# Patient Record
Sex: FEMALE | Race: WHITE | Employment: FULL TIME | URBAN - METROPOLITAN AREA
[De-identification: names, ages, dates, MRNs, and addresses within clinical notes are randomized per-mention and may not be internally consistent; named-entity substitution may affect disease eponyms.]

---

## 2018-09-20 ENCOUNTER — OFFICE VISIT (OUTPATIENT)
Dept: FAMILY MEDICINE CLINIC | Age: 38
End: 2018-09-20

## 2018-09-20 VITALS
WEIGHT: 154 LBS | HEART RATE: 77 BPM | BODY MASS INDEX: 29.07 KG/M2 | HEIGHT: 61 IN | RESPIRATION RATE: 16 BRPM | SYSTOLIC BLOOD PRESSURE: 127 MMHG | DIASTOLIC BLOOD PRESSURE: 84 MMHG | OXYGEN SATURATION: 98 % | TEMPERATURE: 98.4 F

## 2018-09-20 DIAGNOSIS — Z23 ENCOUNTER FOR IMMUNIZATION: ICD-10-CM

## 2018-09-20 DIAGNOSIS — Z11.59 SPECIAL SCREENING EXAMINATION FOR VIRAL DISEASE: ICD-10-CM

## 2018-09-20 DIAGNOSIS — Z00.00 ANNUAL PHYSICAL EXAM: Primary | ICD-10-CM

## 2018-09-20 RX ORDER — NORETHINDRONE AND ETHINYL ESTRADIOL 0.8-25(24)
KIT ORAL
Refills: 4 | COMMUNITY
Start: 2018-07-16

## 2018-09-20 NOTE — MR AVS SNAPSHOT
[address] [place] [address] [facility] 
[phone] Patient: Jay Hatch MRN: [id number] GJY:[birth year] Visit Information Date & Time Provider Department Dept. Phone Encounter #  
 9/20/2018  9:00 AM Raffy Ashby, [address] [facility] [phone] [id number] Follow-up Instructions Return in about 1 year (around 9/20/2019). Upcoming Health Maintenance Date Due DTaP/Tdap/Td series (1 - Tdap) 3/2/2003 PAP AKA CERVICAL CYTOLOGY 3/2/2003 Influenza Age 5 to Adult 8/1/2018 Allergies as of 9/20/2018  Review Complete On: 9/20/2018 By: Raffy Ashby DO Severity Noted Reaction Type Reactions Amoxicillin  09/20/2018    Nausea and Vomiting Antihistamine [Triprolidine-pseudoephedrine]  09/20/2018    Seizures Current Immunizations  Never Reviewed Name Date HPV 8/8/2008, 5/17/2008, 3/10/2008 Not reviewed this visit You Were Diagnosed With   
  
 Codes Comments Annual physical exam    -  Primary ICD-10-CM: Z00.00 ICD-9-CM: V70.0 Special screening examination for viral disease     ICD-10-CM: Z11.59 
ICD-9-CM: V73.99 Encounter for immunization     ICD-10-CM: V30 ICD-9-CM: V03.89 Vitals BP Pulse Temp Resp Height(growth percentile) Weight(growth percentile) 127/84 (BP 1 Location: Left arm, BP Patient Position: Sitting) 77 98.4 °F (36.9 °C) (Oral) 16 5' 1\" (1.549 m) 154 lb (69.9 kg) LMP SpO2 BMI OB Status Smoking Status (LMP Unknown) 98% 29.1 kg/m2 Chemically Induced Never Smoker Vitals History BMI and BSA Data Body Mass Index Body Surface Area  
 29.1 kg/m 2 1.73 m 2 Preferred Pharmacy Pharmacy Name Phone [id number] [place] - [address] [address] AT [address] [phone] Your Updated Medication List  
  
   
This list is accurate as of 9/20/18  9:54 AM.  Always use your most recent med list.  
  
  
 diph,Pertuss(Acell),Tet Vac-PF 2 Lf-(2.5-5-3-5 mcg)-5Lf/0.5 mL susp Commonly known as:  ADACEL  
0.5 mL by IntraMUSCular route once for 1 dose. KAITLIB FE 0.8mg-25mcg(24) and 75 mg (4) Chew Generic drug:  noreth-ethinyl estradiol-iron CHEW 1 TABLET BY MOUTH EVERYDAY WITH OUT PILL FREE INTERVAL Prescriptions Printed Refills diph,Pertuss,Acell,,Tet Vac-PF (ADACEL) 2 Lf-(2.5-5-3-5 mcg)-5Lf/0.5 mL susp 0 Si.5 mL by IntraMUSCular route once for 1 dose. Class: Print Route: IntraMUSCular We Performed the Following CBC W/O DIFF [96495 CPT(R)] DRUG SCREEN-10 W/CONFIRM, [place] [37538 CPT(R)] HBV PREVACCINATION (PROFILE X) [id number] CPT(R)] MEASLES/MUMPS/RUBELLA IMMUNITY [EEG50321 Custom] METABOLIC PANEL, BASIC [99114 CPT(R)] VARICELLA ZOSTER ABS, IGG/IGM [id number] CPT(R)] Follow-up Instructions Return in about 1 year (around 2019). Patient Instructions Well Visit, Ages 25 to 48: Care Instructions Your Care Instructions Physical exams can help you stay healthy. Your doctor has checked your overall health and may have suggested ways to take good care of yourself. He or she also may have recommended tests. At home, you can help prevent illness with healthy eating, regular exercise, and other steps. Follow-up care is a key part of your treatment and safety. Be sure to make and go to all appointments, and call your doctor if you are having problems. It's also a good idea to know your test results and keep a list of the medicines you take. How can you care for yourself at home? · Reach and stay at a healthy weight. This will lower your risk for many problems, such as obesity, diabetes, heart disease, and high blood pressure. · Get at least 30 minutes of physical activity on most days of the week. Walking is a good choice.  You also may want to do other activities, such as running, swimming, cycling, or playing tennis or team sports. Discuss any changes in your exercise program with your doctor. · Do not smoke or allow others to smoke around you. If you need help quitting, talk to your doctor about stop-smoking programs and medicines. These can increase your chances of quitting for good. · Talk to your doctor about whether you have any risk factors for sexually transmitted infections (STIs). Having one sex partner (who does not have STIs and does not have sex with anyone else) is a good way to avoid these infections. · Use birth control if you do not want to have children at this time. Talk with your doctor about the choices available and what might be best for you. · Protect your skin from too much sun. When you're outdoors from 10 a.m. to 4 p.m., stay in the shade or cover up with clothing and a hat with a wide brim. Wear sunglasses that block UV rays. Even when it's cloudy, put broad-spectrum sunscreen (SPF 30 or higher) on any exposed skin. · See a dentist one or two times a year for checkups and to have your teeth cleaned. · Wear a seat belt in the car. · Drink alcohol in moderation, if at all. That means no more than 2 drinks a day for men and 1 drink a day for women. Follow your doctor's advice about when to have certain tests. These tests can spot problems early. For everyone · Cholesterol. Have the fat (cholesterol) in your blood tested after age 21. Your doctor will tell you how often to have this done based on your age, family history, or other things that can increase your risk for heart disease. · Blood pressure. Have your blood pressure checked during a routine doctor visit. Your doctor will tell you how often to check your blood pressure based on your age, your blood pressure results, and other factors. · Vision. Talk with your doctor about how often to have a glaucoma test. 
· Diabetes. Ask your doctor whether you should have tests for diabetes. · Colon cancer. Have a test for colon cancer at age 48. You may have one of several tests. If you are younger than 48, you may need a test earlier if you have any risk factors. Risk factors include whether you already had a precancerous polyp removed from your colon or whether your parent, brother, sister, or child has had colon cancer. For women · Breast exam and mammogram. Talk to your doctor about when you should have a clinical breast exam and a mammogram. Medical experts differ on whether and how often women under 50 should have these tests. Your doctor can help you decide what is right for you. · Pap test and pelvic exam. Begin Pap tests at age 24. A Pap test is the best way to find cervical cancer. The test often is part of a pelvic exam. Ask how often to have this test. 
· Tests for sexually transmitted infections (STIs). Ask whether you should have tests for STIs. You may be at risk if you have sex with more than one person, especially if your partners do not wear condoms. For men · Tests for sexually transmitted infections (STIs). Ask whether you should have tests for STIs. You may be at risk if you have sex with more than one person, especially if you do not wear a condom. · Testicular cancer exam. Ask your doctor whether you should check your testicles regularly. · Prostate exam. Talk to your doctor about whether you should have a blood test (called a PSA test) for prostate cancer. Experts differ on whether and when men should have this test. Some experts suggest it if you are older than 39 and are -American or have a father or brother who got prostate cancer when he was younger than 72. When should you call for help? Watch closely for changes in your health, and be sure to contact your doctor if you have any problems or symptoms that concern you. Where can you learn more? Go to http://ronak-derek.info/. Enter P072 in the search box to learn more about \"Well Visit, Ages 25 to 48: Care Instructions. \" Current as of: May 16, 2017 Content Version: 11.7 © 3105-5901 [employer]. Care instructions adapted under license by [employer] (which disclaims liability or warranty for this information). If you have questions about a medical condition or this instruction, always ask your healthcare professional. [place] [address] any warranty or liability for your use of this information. Introducing [facility] & HEALTH SERVICES! Jose Weber introduces [employer] patient portal. Now you can access parts of your medical record, email your doctor's office, and request medication refills online. 1. In your internet browser, go to [email or web address] 2. Click on the First Time User? Click Here link in the Sign In box. You will see the New Member Sign Up page. 3. Enter your [employer] Access Code exactly as it appears below. You will not need to use this code after youve completed the sign-up process. If you do not sign up before the expiration date, you must request a new code. · [employer] Access Code: [email or web address] Expires: 12/19/2018  9:00 AM 
 
4. Enter the last four digits of your Social Security Number (xxxx) and Date of Birth (mm/dd/yyyy) as indicated and click Submit. You will be taken to the next sign-up page. 5. Create a [employer] ID. This will be your [employer] login ID and cannot be changed, so think of one that is secure and easy to remember. 6. Create a [employer] password. You can change your password at any time. 7. Enter your Password Reset Question and Answer. This can be used at a later time if you forget your password. 8. Enter your e-mail address. You will receive e-mail notification when new information is available in [address] 9. Click Sign Up. You can now view and download portions of your medical record. 10. Click the Download Summary menu link to download a portable copy of your medical information. If you have questions, please visit the Frequently Asked Questions section of the [employer]) website. Remember, [employer] is NOT to be used for urgent needs. For medical emergencies, dial 911. Now available from your iPhone and Android! Please provide this summary of care documentation to your next provider. If you have any questions after today's visit, please call [phone].

## 2018-09-20 NOTE — PROGRESS NOTES
Rosa Escalona is a 41 y.o. female who presents today for her annual checkup    Last annual exam was 2 years ago. No complaints today. 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 LMP: 1 year ago- on birth control. Lázaro Bright. Last pelvic/PAP: 3 months ago, normal. OB/GYN is [facility]. Tested for BRCA and negative. Has a family history of breast cancer. Vegetarian diet. Exercises by walking 3.5 miles/night. Yearly eye exam and UTD on dental exam.     Current Outpatient Prescriptions   Medication Sig Dispense Refill    KAITLIB FE 0.8mg-25mcg(24) and 75 mg (4) chew CHEW 1 TABLET BY MOUTH EVERYDAY WITH OUT PILL FREE INTERVAL  4     Allergies: Amoxicillin and Antihistamine [triprolidine-pseudoephedrine]   Social History     Social History    Marital status:      Spouse name: N/A    Number of children: N/A    Years of education: N/A     Occupational History    Not on file. Social History Main Topics    Smoking status: Never Smoker    Smokeless tobacco: Never Used    Alcohol use 1.8 oz/week     3 Glasses of wine per week      Comment: 3xweek    Drug use: No    Sexual activity: Yes     Partners: Male     Other Topics Concern    Not on file     Social History Narrative    No narrative on file     Family History   Problem Relation Age of Onset    Breast Cancer Mother     Diabetes Mother     Thyroid Disease Mother     Melanoma Father      Past Medical History:   Diagnosis Date    Depression     Hepatitis     unknown type    Incontinentia pigmenti     Seizure ([facility] [address]        Review of Systems - History obtained from the patient  Gen: negative for weight loss, fever, night sweats  HEENT: negative for blurry vision, sore throat.   CV: negative for chest pain, palpitations, edema  Resp: negative for cough, shortness of breath, wheezing  GI: negative for change in bowel habits, abdominal pain, black or bloody stools  : negative for frequency, dysuria, hematuria,  MSK: negative for back pain, joint pain, muscle pain  Skin :negative for itching, rash, hives  Neuro: negative for dizziness, headache, confusion, weakness  Psych: negative for anxiety, depression, SI/HI    Physical Exam    Visit Vitals    /84 (BP 1 Location: Left arm, BP Patient Position: Sitting)    Pulse 77    Temp 98.4 °F (36.9 °C) (Oral)    Resp 16    Ht 5' 1\" (1.549 m)    Wt 154 lb (69.9 kg)    LMP  (LMP Unknown)  Comment: continuous OCP's    SpO2 98%    BMI 29.1 kg/m2     Gen: Well developed, well nourished female in no acute distress  HEENT: normocephalic/atraumatic; PERRL; TM intact, translucent, and neutral BL;  oropharynx shows no erythema or exudates  Skin:  No rashes or suspicious skin lesions noted  Neck:   Supple, no lympadenopathy, no thyromegaly  Card:  RRR, no m/r/g  Chest:  CTAB, no w/r/r  Abd:  BS+, Soft, nontender/nondistended  Extr:  2+ pulses BL, no LE edema   MS:   full ROM, 5/5 strength BL, sensation intact  Neuro: AAO X 3,   Psych:  Nl mood and affect  Pelvic: declined. See OB/GYN      Assessment:    ICD-10-CM ICD-9-CM    1. Annual physical exam X21.36 D99.3 METABOLIC PANEL, BASIC      CBC W/O DIFF      DRUG SCREEN-10 W/CONFIRM, SE   2. Special screening examination for viral disease Z11.59 V73.99 MEASLES/MUMPS/RUBELLA IMMUNITY      VARICELLA ZOSTER ABS, IGG/IGM      HBV PREVACCINATION (PROFILE X)   3. Encounter for immunization Z23 V03.89 diph,Pertuss,Acell,,Tet Vac-PF (ADACEL) 2 Lf-(2.5-5-3-5 mcg)-5Lf/0.5 mL susp      CANCELED: TETANUS, DIPHTHERIA TOXOIDS AND ACELLULAR PERTUSSIS VACCINE (TDAP), IN INDIVIDS. >=7, IM       40 y/o female here for annual exam. 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 Overall, doing well. No complaints. See's OB/GYN for PAP. Will check CBC, BMP, MMR immunity, Varicella immunity, hep B immunity, and uds per school forms. . Our office was out of Tdap at the time of visit so Rx for Tdap given to patient.  She needs 2 step PPD for completion of school form. Okay for her to come back on tomorrow, Friday for first ppd. Will complete and fax forms once all data is collected. Encouraged healthy diet and exercise. Plan:    1. Well woman - physical / health maintenance visit   Counseled re: diet, exercise, healthy lifestyle   Return tomorrow for first PPD placement and return within 48-72 hours for reading. Will need second PPD placed after that. Return for yearly wellness visits    Follow-up Disposition:  Return in about 1 year (around 9/20/2019).       Patient was discussed with supervising attending, Dr. Bob Garza, DO

## 2018-09-20 NOTE — PROGRESS NOTES
I have reviewed the notes, assessments, and/or procedures performed by Dr. Landis Goldberg, I concur with her/his documentation of Jessika Becker.

## 2018-09-20 NOTE — PROGRESS NOTES
1. Have you been to the ER, urgent care clinic since your last visit? Hospitalized since your last visit? No    2. Have you seen or consulted any other health care providers outside of the [facility] since your last visit? Include any pap smears or colon screening. [facility]BG[facility] Dr[facility].  Last pap 3 months ago

## 2018-09-20 NOTE — PATIENT INSTRUCTIONS

## 2018-09-21 ENCOUNTER — CLINICAL SUPPORT (OUTPATIENT)
Dept: FAMILY MEDICINE CLINIC | Age: 38
End: 2018-09-21

## 2018-09-21 DIAGNOSIS — Z11.1 SCREENING FOR TUBERCULOSIS: Primary | ICD-10-CM

## 2018-09-21 NOTE — PROGRESS NOTES
PPD placed right forearm  By Guille Robertson LPN, Patient tolerated well. Patient advised to return Monday before 3pm. Patient advised to contact school regarding rules as to when to get 2nd PPD test performed.  Patient stated understanding

## 2018-09-24 ENCOUNTER — OFFICE VISIT (OUTPATIENT)
Dept: FAMILY MEDICINE CLINIC | Age: 38
End: 2018-09-24

## 2018-09-24 VITALS — DIASTOLIC BLOOD PRESSURE: 81 MMHG | HEART RATE: 96 BPM | TEMPERATURE: 98.4 F | SYSTOLIC BLOOD PRESSURE: 124 MMHG

## 2018-09-24 DIAGNOSIS — Z11.1 ENCOUNTER FOR PPD SKIN TEST READING: Primary | ICD-10-CM

## 2018-09-24 LAB
MM INDURATION POC: 0 MM (ref 0–5)
PPD POC: NEGATIVE NEGATIVE

## 2018-09-24 NOTE — PROGRESS NOTES
PPD Reading Note  [facility] Result: 0mm induration.   Interpretation: Negative  Allergic reaction: no

## 2018-09-24 NOTE — MR AVS SNAPSHOT
Mahi Barone 
 
 
 [address] [place] 
[phone] Patient: Olga Fuchs MRN: [id number] IEB:[birth year] Visit Information Date & Time Provider Department Dept. Phone Encounter #  
 9/24/2018  3:00 PM [facility] [address] [facility] [id number] Your Appointments 9/24/2018  3:00 PM  
Any with NURSE3_CFM [address] ([address] [place] [address] Road) Appt Note: ppd check [address] [facility] [address]  
[phone]  
  
   
 [address] [facility] [address]  
  
    
 10/8/2018  2:30 PM  
Nurse Visit with NURSE3_CFM [address] ([address] [place] [address] Road) Appt Note: ppd test  
 [address] [facility] [address]  
[phone]  
  
   
 [address] [facility] [address]  
  
    
 10/11/2018  2:30 PM  
Nurse Visit with NURSE3_CFM [address] ([address] [place] [address] Road) Appt Note: ppd check [address] [place]  
[phone] Upcoming Health Maintenance Date Due DTaP/Tdap/Td series (1 - Tdap) 3/2/2003 PAP AKA CERVICAL CYTOLOGY 3/2/2003 Influenza Age 5 to Adult 8/1/2018 Allergies as of 9/24/2018  Review Complete On: 9/20/2018 By: Justin Dyer DO Severity Noted Reaction Type Reactions Amoxicillin  09/20/2018    Nausea and Vomiting Antihistamine [Triprolidine-pseudoephedrine]  09/20/2018    Seizures Current Immunizations  Never Reviewed Name Date HPV 8/8/2008, 5/17/2008, 3/10/2008 TB Skin Test (PPD) Intradermal 9/21/2018 Not reviewed this visit You Were Diagnosed With   
  
 Codes Comments Encounter for PPD skin test reading    -  Primary ICD-10-CM: Z11.1 ICD-9-CM: V67.59 Vitals BP Pulse Temp LMP OB Status Smoking Status  124/81 (BP 1 Location: Left arm, BP Patient Position: Sitting) 96 98.4 °F (36.9 °C) (Oral) (LMP Unknown) Chemically Induced Never Smoker Preferred Pharmacy Pharmacy Name Phone [id number] [id number] [address] [address] AT [address] [phone] Your Updated Medication List  
  
   
This list is accurate as of 9/24/18  2:34 PM.  Always use your most recent med list. KAITLIB FE 0.8mg-25mcg(24) and 75 mg (4) Chew Generic drug:  noreth-ethinyl estradiol-iron CHEW 1 TABLET BY MOUTH EVERYDAY WITH OUT PILL FREE INTERVAL Introducing [facility] & HEALTH SERVICES! [address] introduces [employer] patient portal. Now you can access parts of your medical record, email your doctor's office, and request medication refills online. 1. In your internet browser, go to [email or web address] 2. Click on the First Time User? Click Here link in the Sign In box. You will see the New Member Sign Up page. 3. Enter your [employer] Access Code exactly as it appears below. You will not need to use this code after youve completed the sign-up process. If you do not sign up before the expiration date, you must request a new code. · [employer] Access Code: [email or web address] Expires: 12/19/2018  9:00 AM 
 
4. Enter the last four digits of your Social Security Number (xxxx) and Date of Birth (mm/dd/yyyy) as indicated and click Submit. You will be taken to the next sign-up page. 5. Create a [employer] ID. This will be your [employer] login ID and cannot be changed, so think of one that is secure and easy to remember. 6. Create a [employer] password. You can change your password at any time. 7. Enter your Password Reset Question and Answer. This can be used at a later time if you forget your password. 8. Enter your e-mail address. You will receive e-mail notification when new information is available in [address] 9. Click Sign Up. You can now view and download portions of your medical record. 10. Click the Download Summary menu link to download a portable copy of your medical information. If you have questions, please visit the Frequently Asked Questions section of the [employer] website. Remember, [employer] is NOT to be used for urgent needs. For medical emergencies, dial 911. Now available from your iPhone and Android! Please provide this summary of care documentation to your next provider. Your primary care clinician is listed as Smita Woodruff. If you have any questions after today's visit, please call [phone].

## 2018-09-25 LAB
AMPHETAMINES SERPL QL SCN: NEGATIVE NG/ML
BARBITURATES SERPL QL SCN: NEGATIVE UG/ML
BENZODIAZ SERPL QL SCN: NEGATIVE NG/ML
BUN SERPL-MCNC: 9 MG/DL (ref 6–20)
BUN/CREAT SERPL: 13 (ref 9–23)
CALCIUM SERPL-MCNC: 10.1 MG/DL (ref 8.7–10.2)
CANNABINOIDS SERPL QL SCN: NEGATIVE NG/ML
CHLORIDE SERPL-SCNC: 100 MMOL/L (ref 96–106)
CO2 SERPL-SCNC: 24 MMOL/L (ref 20–29)
COCAINE+BZE SERPL QL SCN: NEGATIVE NG/ML
CREAT SERPL-MCNC: 0.7 MG/DL (ref 0.57–1)
ERYTHROCYTE [DISTWIDTH] IN BLOOD BY AUTOMATED COUNT: 12.6 % (ref 12.3–15.4)
GLUCOSE SERPL-MCNC: 87 MG/DL (ref 65–99)
HBV CORE AB SERPL QL IA: NEGATIVE
HBV CORE IGM SERPL QL IA: NEGATIVE
HBV SURFACE AB SER QL: REACTIVE
HBV SURFACE AG SERPL QL IA: NEGATIVE
HCT VFR BLD AUTO: 40.7 % (ref 34–46.6)
HGB BLD-MCNC: 14.1 G/DL (ref 11.1–15.9)
MCH RBC QN AUTO: 32.6 PG (ref 26.6–33)
MCHC RBC AUTO-ENTMCNC: 34.6 G/DL (ref 31.5–35.7)
MCV RBC AUTO: 94 FL (ref 79–97)
METHADONE SERPL QL SCN: NEGATIVE NG/ML
MEV IGG SER IA-ACNC: 180 AU/ML
MUV IGG SER IA-ACNC: 108 AU/ML
OPIATES SERPL QL SCN: NEGATIVE NG/ML
OXYCODONES, 738315: NEGATIVE NG/ML
PCP SERPL QL SCN: NEGATIVE NG/ML
PLATELET # BLD AUTO: 353 X10E3/UL (ref 150–379)
POTASSIUM SERPL-SCNC: 4.2 MMOL/L (ref 3.5–5.2)
PROPOXYPH SERPL QL SCN: NEGATIVE NG/ML
RBC # BLD AUTO: 4.32 X10E6/UL (ref 3.77–5.28)
RUBV IGG SERPL IA-ACNC: 1.89 INDEX
SODIUM SERPL-SCNC: 139 MMOL/L (ref 134–144)
VZV IGG SER IA-ACNC: 1171 INDEX
VZV IGM SER IA-ACNC: <0.91 INDEX (ref 0–0.9)
WBC # BLD AUTO: 6.7 X10E3/UL (ref 3.4–10.8)

## 2018-09-27 ENCOUNTER — TELEPHONE (OUTPATIENT)
Dept: FAMILY MEDICINE CLINIC | Age: 38
End: 2018-09-27

## 2018-10-04 PROBLEM — K64.9 HEMORRHOIDS: Status: ACTIVE | Noted: 2018-10-04

## 2018-10-04 PROBLEM — F32.A DEPRESSION: Status: ACTIVE | Noted: 2018-10-04

## 2018-10-08 ENCOUNTER — CLINICAL SUPPORT (OUTPATIENT)
Dept: FAMILY MEDICINE CLINIC | Age: 38
End: 2018-10-08

## 2018-10-08 VITALS — DIASTOLIC BLOOD PRESSURE: 78 MMHG | TEMPERATURE: 98.9 F | SYSTOLIC BLOOD PRESSURE: 120 MMHG | HEART RATE: 80 BPM

## 2018-10-08 DIAGNOSIS — Z23 ENCOUNTER FOR IMMUNIZATION: Primary | ICD-10-CM

## 2018-10-10 ENCOUNTER — HOSPITAL ENCOUNTER (OUTPATIENT)
Dept: GENERAL RADIOLOGY | Age: 38
Discharge: HOME | End: 2018-10-10
Attending: FAMILY MEDICINE
Payer: COMMERCIAL

## 2018-10-10 ENCOUNTER — CLINICAL SUPPORT (OUTPATIENT)
Dept: FAMILY MEDICINE CLINIC | Age: 38
End: 2018-10-10

## 2018-10-10 DIAGNOSIS — Z00.00 HEALTH CARE MAINTENANCE: Primary | ICD-10-CM

## 2018-10-10 DIAGNOSIS — R76.11 POSITIVE PPD: Primary | ICD-10-CM

## 2018-10-10 DIAGNOSIS — R76.11 POSITIVE PPD: ICD-10-CM

## 2018-10-10 PROCEDURE — 71046 X-RAY EXAM CHEST 2 VIEWS: CPT

## 2018-10-10 RX ORDER — CLOSTRIDIUM TETANI TOXOID ANTIGEN (FORMALDEHYDE INACTIVATED), CORYNEBACTERIUM DIPHTHERIAE TOXOID ANTIGEN (FORMALDEHYDE INACTIVATED), BORDETELLA PERTUSSIS TOXOID ANTIGEN (GLUTARALDEHYDE INACTIVATED), BORDETELLA PERTUSSIS FILAMENTOUS HEMAGGLUTININ ANTIGEN (FORMALDEHYDE INACTIVATED), BORDETELLA PERTUSSIS PERTACTIN ANTIGEN, AND BORDETELLA PERTUSSIS FIMBRIAE 2/3 ANTIGEN 5; 2; 2.5; 5; 3; 5 [LF]/.5ML; [LF]/.5ML; UG/.5ML; UG/.5ML; UG/.5ML; UG/.5ML
INJECTION, SUSPENSION INTRAMUSCULAR
Refills: 0 | COMMUNITY
Start: 2018-09-20 | End: 2018-12-04 | Stop reason: ALTCHOICE

## 2018-10-10 NOTE — PROGRESS NOTES
lainey returned to the office for ppd reading. Patient tested 15mm, positive. Dr. Presley Smoke advised. First ppd reading was negative. Second ppd reading positive 15mm.

## 2018-10-11 ENCOUNTER — TELEPHONE (OUTPATIENT)
Dept: FAMILY MEDICINE CLINIC | Age: 38
End: 2018-10-11

## 2018-10-22 ENCOUNTER — OFFICE VISIT (OUTPATIENT)
Dept: FAMILY MEDICINE CLINIC | Age: 38
End: 2018-10-22

## 2018-10-22 VITALS
HEART RATE: 78 BPM | DIASTOLIC BLOOD PRESSURE: 87 MMHG | HEIGHT: 61 IN | OXYGEN SATURATION: 98 % | TEMPERATURE: 98.9 F | BODY MASS INDEX: 29.45 KG/M2 | SYSTOLIC BLOOD PRESSURE: 129 MMHG | WEIGHT: 156 LBS | RESPIRATION RATE: 16 BRPM

## 2018-10-22 DIAGNOSIS — R05.9 COUGH: ICD-10-CM

## 2018-10-22 DIAGNOSIS — R76.11 POSITIVE PPD: Primary | ICD-10-CM

## 2018-10-22 RX ORDER — ISONIAZID 300 MG/1
300 TABLET ORAL DAILY
Qty: 30 TAB | Refills: 9 | Status: SHIPPED | OUTPATIENT
Start: 2018-10-22 | End: 2018-11-21

## 2018-10-22 RX ORDER — LANOLIN ALCOHOL/MO/W.PET/CERES
50 CREAM (GRAM) TOPICAL DAILY
Qty: 30 TAB | Refills: 9 | Status: SHIPPED | OUTPATIENT
Start: 2018-10-22 | End: 2019-09-25

## 2018-10-22 NOTE — PROGRESS NOTES
Quinn Luo is a 41 y.o. female who had concerns including Results (chest x ray ); Immunization/Injection (Patient desires Hep B injection ); and Cough (dry cough, sick 3 weeks ago but now cough has continued but feels ok). Recently had a positive PPD. Repeat CXR was negative. No previous exposure to someone with TB. Did do a lot of traveling out of the country during previous job, often traveling to [place] and [facility]. 1st PPD was negative, however 2nd PPD was positive. No night sweats or fevers. reports cough. Dry cough. Been taking mucinex with minimal relief. Cough has been present for 3 weeks. No history of heart burn. No hemoptysis. Denies fever, chills, n/v, d/c. Cough worse when she lays down to go to bed. Dry cough w/o sputum production. ROS: (positive in bold)  General: wt loss, fever, chills, fatigue   HEENT: changes in vision,sore throat, runny nose  Cardiac: chest pain, palpitations, PEREZ, edema   Pul: SOB, dyspnea, wheezing, cough, hemoptysis  GI: abdominal pain, N&V, diarrhea, constipation   Neuro: weakness, parasthesias, headache, lightheaded, dizziness. Past Medical History:  Past Medical History:   Diagnosis Date    Depression     Hepatitis     unknown type    Incontinentia pigmenti     Seizure ([facility] [address]        Past Surgical History:  Past Surgical History:   Procedure Laterality Date    HX SKIN BIOPSY         Family History:  Family History   Problem Relation Age of Onset    Breast Cancer Mother     Diabetes Mother     Thyroid Disease Mother     Melanoma Father        Allergies: Allergies   Allergen Reactions    Amoxicillin Nausea and Vomiting    Antihistamine [Triprolidine-Pseudoephedrine] Seizures       Social History:  Social History     Tobacco Use    Smoking status: Never Smoker    Smokeless tobacco: Never Used   Substance Use Topics    Alcohol use: Yes     Alcohol/week: 1.8 oz     Types: 3 Glasses of wine per week     Comment: 3xweek    Drug use:  No Current Meds:  Current Outpatient Medications on File Prior to Visit   Medication Sig Dispense Refill    ADACEL,TDAP ADOLESN/ADULT,,PF, 2 Lf-(2.5-5-3-5 mcg)-5Lf/0.5 mL syrg vaccine TO BE ADMINISTERED BY PHARMACIST FOR IMMUNIZATION  0    FLUZONE QUAD 7313-9141, PF, syrg injection TO BE ADMINISTERED BY PHARMACIST FOR IMMUNIZATION  0    KAITLIB FE 0.8mg-25mcg(24) and 75 mg (4) chew CHEW 1 TABLET BY MOUTH EVERYDAY WITH OUT PILL FREE INTERVAL  4     No current facility-administered medications on file prior to visit. Visit Vitals  /87 (BP 1 Location: Left arm, BP Patient Position: Sitting)   Pulse 78   Temp 98.9 °F (37.2 °C) (Oral)   Resp 16   Ht 5' 1\" (1.549 m)   Wt 156 lb (70.8 kg)   SpO2 98%   BMI 29.48 kg/m²       Gen:  Well developed, well nourished female in no acute distress  HEENT: normocephalic/atraumatic;EOMI  Card:  RRR, no m/r/g  Chest:  CTAB, no w/r/r  Abd:  BS+, Soft, nontender/nondistended  Psych:  Nl mood and affect     Assessment:      ICD-10-CM ICD-9-CM    1. Positive PPD R76.11 795.51 HEPATIC FUNCTION PANEL      isoniazid (NYDRAZID) 300 mg tablet      pyridoxine, vitamin B6, (VITAMIN B-6) 50 mg tablet   2. Cough R05 786.2       1. Positive PPD  2nd PPD in 2 part PPD series was positive. Likely latent TB. She underwent CXR which was unremarkable. No known exposure to TB. Very low risk for TB. Discussed treatment options including repeat interferon or staring INH for 6-9months. Discussed with pulmonology and they recommend starting INH. Discussed with patient and she agrees. Will start INH and vit B6. Will check LFT's and will need to repeat in 1 month. She does have a remote history of hepatitis, but per patient her liver function has since improved. 2. Cough  Viral URI vs silent GERD. No signs of bacterial infection. Likely silent GERD. Will try OTC PPI to see if symptoms resolve. I think cough is less likely related to above positive PPD. Will continue to monitor. Plan:    1. Positive PPD  - Start INH and Vit B6. Will continue therapy for 6-9 months. - Check LFT's now and in 1 month. - Monitor for side effects to medication  - Follow-up in 1 month. 2. Cough  - Try OTC PPI to see if symptoms resolve  - Continue mucinex and flonase PRN  - Monitor for fever, chills, sputum production or coughing up blood and return to clinic if you develop any of the above symptoms    I have discussed the diagnosis with the patient and the intended plan as seen in the above orders. The patient has received an after-visit summary and questions were answered concerning future plans. I have discussed medication side effects and warnings with the patient as well. The patient agrees and understands above plan. Follow-up Disposition:  Return in about 1 month (around 11/22/2018) for Medication follow-up; INH. Patient discussed with supervising attending.     Itz Marmolejo,

## 2018-10-22 NOTE — PROGRESS NOTES
I have reviewed the notes, assessments, and/or procedures performed, I concur with the residents documentation of Hammad Ledbetter.

## 2018-10-22 NOTE — PROGRESS NOTES
Identified pt with two pt identifiers(name and ). Chief Complaint   Patient presents with    Results     chest x ray     Immunization/Injection     Patient desires Hep B injection         Health Maintenance Due   Topic    PAP AKA CERVICAL CYTOLOGY        Wt Readings from Last 3 Encounters:   10/22/18 156 lb (70.8 kg)   18 154 lb (69.9 kg)     Temp Readings from Last 3 Encounters:   10/08/18 98.9 °F (37.2 °C) (Oral)   18 98.4 °F (36.9 °C) (Oral)   18 98.4 °F (36.9 °C) (Oral)     BP Readings from Last 3 Encounters:   10/08/18 120/78   18 124/81   18 127/84     Pulse Readings from Last 3 Encounters:   10/08/18 80   18 96   18 77         Learning Assessment:  :     No flowsheet data found. Depression Screening:  :     PHQ over the last two weeks 2018   Little interest or pleasure in doing things Not at all   Feeling down, depressed, irritable, or hopeless Not at all   Total Score PHQ 2 0       Fall Risk Assessment:  :     No flowsheet data found. Abuse Screening:  :     No flowsheet data found. Coordination of Care Questionnaire:  :     1) Have you been to an emergency room, urgent care clinic since your last visit? no   Hospitalized since your last visit? no             2) Have you seen or consulted any other health care providers outside of [address] [place] [address] since your last visit? no  (Include any pap smears or colon screenings in this section.)    3) Do you have an Advance Directive on file? no  Are you interested in receiving information about Advance Directives? no    Patient is accompanied by self I have received verbal consent from Hammad Ledbetter to discuss any/all medical information while they are present in the room. Reviewed record in preparation for visit and have obtained necessary documentation. Medication reconciliation up to date and corrected with patient at this time.

## 2018-10-22 NOTE — PATIENT INSTRUCTIONS
Tuberculosis (Latent TB): Care Instructions  Your Care Instructions    Latent tuberculosis (TB) means that you have bacteria in your body that could cause active TB disease. You can't spread the bacteria to other people at this time. But if your immune system can't keep the bacteria from growing, the disease becomes active. People with weakened immune systems are more likely to develop active TB. With active TB in your lungs, you can spread the disease to others. Active TB is a serious disease. Latent TB doesn't have any symptoms. You may even be surprised that you have it, since you don't feel sick. It's very important to take your antibiotic medicine as your doctor tells you to. This treatment prevents you from getting active TB. It takes a long time to rid your body of TB. You may be taking medicine for 4 to 9 months. During your treatment you'll see your doctor for tests to see how the medicine is working. Your doctor will help guide you through this long process. You may have directly observed therapy (DOT). This means that a health care worker watches when you take your medicine. DOT helps you remember to take your medicine. And it helps you complete your treatment as soon as possible. Follow-up care is a key part of your treatment and safety. Be sure to make and go to all appointments, and call your doctor if you are having problems. It's also a good idea to know your test results and keep a list of the medicines you take. How can you care for yourself at home? · Take your antibiotics as directed. You need to take the full course of antibiotics. · If you get an upset stomach while taking the medicine, ask your doctor if it's okay to take it with food. · If you forget to take your medicine, take the dose as soon as you can if it's the same day. Do not take two doses at the same time. If the day has passed, then take your next scheduled dose.  Tell your doctor or public health worker that you missed a dose so he or she can adjust your treatment schedule. · Avoid drinking alcohol. Alcohol may interact with your medicine and cause side effects. · If you don't have DOT, there are things you can do to help remind you to take your medicine:  ? Take your medicine at the same time every day. ? Set a reminder alarm. ? Use a pillbox. ? Put a reminder note on your mirror or refrigerator. ? Karlene Sang a calendar after you take your medicine. When should you call for help? Call 911 anytime you think you may need emergency care. For example, call if:    · You have severe trouble breathing.    Call your doctor now or seek immediate medical care if:    · You are short of breath.     · You have a new or worse cough.     · You are dizzy or lightheaded, or you feel like you may faint.     · You have new or worse diarrhea.    Watch closely for changes in your health, and be sure to contact your doctor if:    · You lose weight.     · You have night sweats.     · You do not get better as expected. Where can you learn more? Go to http://ronak-derek.info/. Enter U371 in the search box to learn more about \"Tuberculosis (Latent TB): Care Instructions. \"  Current as of: November 18, 2017  Content Version: 11.8  © 3386-9301 [employer]. Care instructions adapted under license by [employer] (which disclaims liability or warranty for this information). If you have questions about a medical condition or this instruction, always ask your healthcare professional. [place] [address] any warranty or liability for your use of this information. Isoniazid (By mouth)   Isoniazid (eye-chris-RODRICK-a-zid)  Treats tuberculosis (TB). Brand Name(s):   There may be other brand names for this medicine. When This Medicine Should Not Be Used:    You should not use this medicine if you have ever had an allergic reaction to isoniazid or reactions such as fever, chills, or arthritis from taking this medicine. You should not use isoniazid if you have liver damage from taking it before, or if you have severe liver disease of any type. How to Use This Medicine:   Tablet, Liquid  · Your doctor will tell you how much medicine to take and how often. It is very important to take this medicine on a regular schedule. · You may need to take isoniazid for several weeks or months. Keep taking it even if you have no symptoms. Your doctor will tell you when you can stop taking it. · Take on an empty stomach (1 hour before or 2 hours after a meal). May be taken with food to avoid stomach upset. · Shake the oral liquid well just before each use. Measure the dose using a marked measuring spoon or medicine cup. If a dose is missed:   · Take the missed dose as soon as possible. · Skip the missed dose if it is almost time for your next regular dose. · You should not use two doses at the same time. How to Store and Dispose of This Medicine:   · Store at room temperature, away from heat, moisture, and direct light. Do not freeze. · Keep all medicine out of the reach of children. Drugs and Foods to Avoid:   Ask your doctor or pharmacist before using any other medicine, including over-the-counter medicines, vitamins, and herbal products. · Do not drink alcohol while taking isoniazid. · Make sure your doctor knows if you are taking blood thinners (such as Coumadin®), birth control pills, Dilantin®, or Antabuse®. · If you take antacids (such as Mylanta®, Maalox®, or Amphojel®), wait at least 1 hour after taking them before taking your isoniazid dose. · Foods such as cheese ([address], [place], or [place]) or fish (tuna or skipjack) may cause headache, flushing, pounding heartbeat, sweating, dizziness, chills, or diarrhea. If you have these symptoms, call your doctor. Warnings While Using This Medicine:   · If you are pregnant or breastfeeding, talk to your doctor before taking this medicine.   · Check with your doctor before taking if you have kidney or liver disease, or if you are being treated for seizures. · Isoniazid can cause liver problems. It is very important that you see your doctor on a regular schedule and have blood tests done as ordered. · Isoniazid can affect the results of some urine sugar tests. If you have diabetes do not change your medicine or diet unless you have checked with your doctor. · If you notice tingling, numbness, or pain in your hands or feet, contact your doctor. Possible Side Effects While Using This Medicine:   Call your doctor right away if you notice any of these side effects:  · Yellow skin or eyes  · Dark or sheila-colored urine  · Severe stomach pain, nausea and vomiting  · Weakness  · Blurred vision, eye pain  · Fever  If you notice these less serious side effects, talk with your doctor:   · Stomach pain  · Diarrhea  If you notice other side effects that you think are caused by this medicine, tell your doctor. Call your doctor for medical advice about side effects. You may report side effects to FDA at 5-075-FDA-5068  © 2017 [facility] [place] Information is for End User's use only and may not be sold, redistributed or otherwise used for commercial purposes. The above information is an  only. It is not intended as medical advice for individual conditions or treatments. Talk to your doctor, nurse or pharmacist before following any medical regimen to see if it is safe and effective for you.

## 2018-10-23 LAB
ALBUMIN SERPL-MCNC: 4.8 G/DL (ref 3.5–5.5)
ALP SERPL-CCNC: 77 IU/L (ref 39–117)
ALT SERPL-CCNC: 10 IU/L (ref 0–32)
AST SERPL-CCNC: 12 IU/L (ref 0–40)
BILIRUB DIRECT SERPL-MCNC: 0.14 MG/DL (ref 0–0.4)
BILIRUB SERPL-MCNC: 0.4 MG/DL (ref 0–1.2)
PROT SERPL-MCNC: 7.2 G/DL (ref 6–8.5)

## 2018-10-24 ENCOUNTER — TELEPHONE (OUTPATIENT)
Dept: FAMILY MEDICINE CLINIC | Age: 38
End: 2018-10-24

## 2018-11-05 ENCOUNTER — CLINICAL SUPPORT (OUTPATIENT)
Dept: FAMILY MEDICINE CLINIC | Age: 38
End: 2018-11-05

## 2018-11-05 VITALS
TEMPERATURE: 98.7 F | RESPIRATION RATE: 18 BRPM | HEART RATE: 86 BPM | SYSTOLIC BLOOD PRESSURE: 116 MMHG | DIASTOLIC BLOOD PRESSURE: 78 MMHG

## 2018-11-05 DIAGNOSIS — Z23 ENCOUNTER FOR IMMUNIZATION: Primary | ICD-10-CM

## 2018-11-05 NOTE — PATIENT INSTRUCTIONS
Vaccine Information Statement     Hepatitis B Vaccine: What You Need to Know    Many Vaccine Information Statements are available in Sets[place] and other languages. See www.immunize.org/vis. Hojas de información sobre vacunas están disponibles en español y en muchos otros idiomas. Visite www.immunize.org/vis    1. Why get vaccinated? Hepatitis B is a serious disease that affects the liver. It is caused by the hepatitis B virus. Hepatitis B can cause mild illness lasting a few weeks, or it can lead to a serious, lifelong illness. Hepatitis B virus infection can be either acute or chronic. Acute hepatitis B virus infection is a short-term illness that occurs within the first 6 months after someone is exposed to the hepatitis B virus. This can lead to:   fever, fatigue, loss of appetite, nausea, and/or vomiting   jaundice (yellow skin or eyes, dark urine, gilberto-colored bowel movements)   pain in muscles, joints, and stomach    Chronic hepatitis B virus infection is a long-term illness that occurs when the hepatitis B virus remains in a persons body. Most people who go on to develop chronic hepatitis B do not have symptoms, but it is still very serious and can lead to:   liver damage (cirrhosis)   liver cancer   death    Chronically-infected people can spread hepatitis B virus to others, even if they do not feel or look sick themselves. Up to 1.4 million people in the [place] may have chronic hepatitis B infection. About 90% of infants who get hepatitis B become chronically infected and about 1 out of 4 of them dies. Hepatitis B is spread when blood, semen, or other body fluid infected with the Hepatitis B virus enters the body of a person who is not infected.  People can become infected with the virus through:   Birth (a baby whose mother is infected can be infected at or after birth)  [employer] such as razors or toothbrushes with an infected person   Contact with the blood or open sores of an infected person   Sex with an infected partner   Sharing needles, syringes, or other drug-injection equipment   Exposure to blood from needlesticks or other sharp instruments    Each year about 2,000 people in the [facility] die from hepatitis B-related liver disease. Hepatitis B vaccine can prevent hepatitis B and its consequences, including liver cancer and cirrhosis. 2. Hepatitis B vaccine    Hepatitis B vaccine is made from parts of the hepatitis B virus. It cannot cause hepatitis B infection. The vaccine is usually given as 2, 3, or 4 shots over 1 to 6 months. Infants should get their first dose of hepatitis B vaccine at birth and will usually complete the series at 7 months of age. All children and adolescents younger than 23years of age who have not yet gotten the vaccine should also be vaccinated. Hepatitis B vaccine is recommended for unvaccinated adults who are at risk for hepatitis B virus infection, including:   People whose sex partners have hepatitis B   Sexually active persons who are not in a long-term monogamous relationship   Persons seeking evaluation or treatment for a sexually transmitted disease   Men who have sexual contact with other men   People who share needles, syringes, or other drug-injection equipment   People who have household contact with someone infected with the hepatitis B virus  [address] care and public safety workers at risk for exposure to blood or body fluids    Residents and staff of facilities for developmentally disabled persons   Persons in correctional facilities   Victims of sexual assault or abuse   Travelers to regions with increased rates of hepatitis B   People with chronic liver disease, kidney disease, HIV infection, or diabetes   Anyone who wants to be protected from hepatitis B     There are no known risks to getting hepatitis B vaccine at the same time as other vaccines.     3. Some people should not get this vaccine. Tell the person who is giving the vaccine:     If the person getting the vaccine has any severe, life-threatening allergies. If you ever had a life-threatening allergic reaction after a dose of hepatitis B vaccine, or have a severe allergy to any part of this vaccine, you may be advised not to get vaccinated. Ask your health care provider if you want information about vaccine components.  If the person getting the vaccine is not feeling well. If you have a mild illness, such as a cold, you can probably get the vaccine today. If you are moderately or severely ill, you should probably wait until you recover. Your doctor can advise you. 4. Risks of a vaccine reaction    With any medicine, including vaccines, there is a chance of side effects. These are usually mild and go away on their own, but serious reactions are also possible. Most people who get hepatitis B vaccine do not have any problems with it. Minor problems following hepatitis B vaccine include:    soreness where the shot was given   temperature of 99.9°F or higher  If these problems occur, they usually begin soon after the shot and last 1 or 2 days. Your doctor can tell you more about these reactions. Other problems that could happen after this vaccine:     People sometimes faint after a medical procedure, including vaccination. Sitting or lying down for about 15 minutes can help prevent fainting and injuries caused by a fall. Tell your provider if you feel dizzy, or have vision changes or ringing in the ears.  Some people get shoulder pain that can be more severe and longer-lasting than the more routine soreness that can follow injections. This happens very rarely.  Any medication can cause a severe allergic reaction. Such reactions from a vaccine are very rare, estimated at about 1 in a million doses, and would happen within a few minutes to a few hours after the vaccination.     As with any medicine, there is a very remote chance of a vaccine causing a serious injury or death. The safety of vaccines is always being monitored. For more information, visit: www.cdc.gov/vaccinesafety/    5. What if there is a serious problem? What should I look for?  Look for anything that concerns you, such as signs of a severe allergic reaction, very high fever, or unusual behavior. Signs of a severe allergic reaction can include hives, swelling of the face and throat, difficulty breathing, a fast heartbeat, dizziness, and weakness. These would usually start a few minutes to a few hours after the vaccination. What should I do?  If you think it is a severe allergic reaction or other emergency that cant wait, call 9-1-1 and get to the nearest hospital. Otherwise, call your clinic. Afterward, the reaction should be reported to the Vaccine Adverse Event Reporting System (VAERS). Your doctor should file this report, or you can do it yourself through the VAERS web site at www.vaers. [facility].gov, or by calling 5-120.954.9726. VAERS does not give medical advice. 6. The National Vaccine Injury Compensation Program    The [facility] Vaccine Injury Compensation Program (VICP) is a federal program that was created to compensate people who may have been injured by certain vaccines. Persons who believe they may have been injured by a vaccine can learn about the program and about filing a claim by calling 2-128.923.7959 or visiting the [address] website at www.[facility].gov/vaccinecompensation. There is a time limit to file a claim for compensation. 7. How can I learn more?  Ask your healthcare provider. He or she can give you the vaccine package insert or suggest other sources of information.  Call your local or state health department.    Contact the Centers for Disease Control and Prevention (CDC):  - Call 5-638.108.3530 (1-800-CDC-INFO) or  - Visit CDCs website at www.cdc.gov/vaccines    Vaccine Information Statement   Hepatitis B Vaccine  10/12/2018  42 U. S.C. § 300aa-26    [place]  Department of Health and Human Services  Centers for Disease Control and Prevention    Office Use Only

## 2018-11-05 NOTE — PROGRESS NOTES
Ivonne Garland is a 41 y.o. female who presents for routine immunizations. She denies any symptoms , reactions or allergies that would exclude them from being immunized today. Risks and adverse reactions were discussed and the VIS was given to them. All questions were addressed. She was observed for 10 min post injection. There were no reactions observed. Stefanie Underwood LPN     Per Dr. Juanis Quintana note on 9/27/18 patient is here to receive her Hep B vaccination. Patient is here for the 2nd out of 2 vaccines.

## 2018-11-23 ENCOUNTER — OFFICE VISIT (OUTPATIENT)
Dept: FAMILY MEDICINE CLINIC | Age: 38
End: 2018-11-23

## 2018-11-23 VITALS
TEMPERATURE: 98.7 F | HEIGHT: 61 IN | RESPIRATION RATE: 16 BRPM | SYSTOLIC BLOOD PRESSURE: 119 MMHG | WEIGHT: 153 LBS | DIASTOLIC BLOOD PRESSURE: 73 MMHG | BODY MASS INDEX: 28.89 KG/M2 | OXYGEN SATURATION: 100 % | HEART RATE: 75 BPM

## 2018-11-23 DIAGNOSIS — Z79.899 LONG-TERM USE OF HIGH-RISK MEDICATION: ICD-10-CM

## 2018-11-23 DIAGNOSIS — R76.11 POSITIVE PPD: Primary | ICD-10-CM

## 2018-11-23 RX ORDER — ISONIAZID 300 MG/1
300 TABLET ORAL DAILY
COMMUNITY
End: 2019-09-25

## 2018-11-23 NOTE — LETTER
11/23/2018 9:08 AM 
 
To whom it may concern, 
 
Ms. Miguel Plascencia [id number] [address] Patient is being followed in our clinic for treatment of possible TB exposure. She will undergo treatment for 6-9 months. Please call if you have any questions or concern. Sincerely, Brenda Cough, DO

## 2018-11-23 NOTE — PROGRESS NOTES
Graciela Jones is a 41 y.o. female      Chief Complaint   Patient presents with    Follow-up     medication ( Isoiazid)         1. Have you been to the ER, urgent care clinic since your last visit? Hospitalized since your last visit?   no      2. Have you seen or consulted any other health care providers outside of the [address] [place] [address] since your last visit? Include any pap smears or colon screening.   No

## 2018-11-23 NOTE — PROGRESS NOTES
Janette Sood is a 41 y.o. female who had concerns including Follow-up (medication ( Isoiazid)). Patient presents today for follow-up positive PPD currently undergoing treatment with Isoniazid. She is taking medication as prescribed. No side effects to the medication. She has been on the medication for 1 month. Denies cough, chest pain, sob, n/v, hemoptysis. No alcohol or cheese consumption. Overall, doing well. ROS: (positive in bold)  General: wt loss, fever, chills, fatigue   Skin: rashes  Cardiac: chest pain  Pul: SOB, dyspnea, cough, hemoptysis  GI: abdominal pain, N&V, diarrhea, constipation   MS:  myalgia  Neuro: parasthesias,       Past Medical History:  Past Medical History:   Diagnosis Date    Depression     Hepatitis     unknown type    Incontinentia pigmenti     Seizure ([address]        Past Surgical History:  Past Surgical History:   Procedure Laterality Date    HX SKIN BIOPSY         Family History:  Family History   Problem Relation Age of Onset    Breast Cancer Mother     Diabetes Mother     Thyroid Disease Mother     Melanoma Father        Allergies: Allergies   Allergen Reactions    Amoxicillin Nausea and Vomiting    Antihistamine [Triprolidine-Pseudoephedrine] Seizures       Social History:  Social History     Tobacco Use    Smoking status: Never Smoker    Smokeless tobacco: Never Used   Substance Use Topics    Alcohol use: No     Alcohol/week: 1.8 oz     Types: 3 Glasses of wine per week     Frequency: Never     Comment: 3xweek    Drug use: No       Current Meds:  Current Outpatient Medications on File Prior to Visit   Medication Sig Dispense Refill    isoniazid (NYDRAZID) 300 mg tablet Take 300 mg by mouth daily.  pyridoxine, vitamin B6, (VITAMIN B-6) 50 mg tablet Take 1 Tab by mouth daily.  30 Tab 9    ADACEL,TDAP ADOLESN/ADULT,,PF, 2 Lf-(2.5-5-3-5 mcg)-5Lf/0.5 mL syrg vaccine TO BE ADMINISTERED BY PHARMACIST FOR IMMUNIZATION  0    FLUZONE QUAD 0416-7646, PF, syrg injection TO BE ADMINISTERED BY PHARMACIST FOR IMMUNIZATION  0    KAITLIB FE 0.8mg-25mcg(24) and 75 mg (4) chew CHEW 1 TABLET BY MOUTH EVERYDAY WITH OUT PILL FREE INTERVAL  4     No current facility-administered medications on file prior to visit. Visit Vitals  /73 (BP 1 Location: Right arm, BP Patient Position: Sitting)   Pulse 75   Temp 98.7 °F (37.1 °C) (Oral)   Resp 16   Ht 5' 1\" (1.549 m)   Wt 153 lb (69.4 kg)   SpO2 100%   BMI 28.91 kg/m²       Gen:  Well developed, well nourished female in no acute distress  HEENT: normocephalic/atraumatic;EOMI  Card:  RRR, no m/r/g  Chest:  CTAB, no w/r/r  Abd:  BS+, Soft, nontender/nondistended  Extr:  2+ pulses BL, no LE edema   Psych:  Nl mood and affect     Assessment/Plan:      ICD-10-CM ICD-9-CM    1. Positive PPD R76.11 795.51 HEPATIC FUNCTION PANEL   2. Long-term use of high-risk medication Z79.899 V58.69 HEPATIC FUNCTION PANEL      Patient ucrrently undergoing treatmet with Isoniazid for a positive PPD. Tolerating medication without difficulties. Will check LFTs as she has been on medication for 1 month. Will need to recheck LFT's at next visit-3 months. Continue current treatment plan. She will need to be on Isoniazid for 6-9 months. Letter filled out for school. I have discussed the diagnosis with the patient and the intended plan as seen in the above orders. The patient has received an after-visit summary and questions were answered concerning future plans. I have discussed medication side effects and warnings with the patient as well. The patient agrees and understands above plan. Follow-up Disposition:  Return in about 3 months (around 2/23/2019) for Medication follow-up. Patient discussed with supervising attending.     Benito Christy DO

## 2018-11-24 LAB
ALBUMIN SERPL-MCNC: 4.6 G/DL (ref 3.5–5.5)
ALP SERPL-CCNC: 80 IU/L (ref 39–117)
ALT SERPL-CCNC: 13 IU/L (ref 0–32)
AST SERPL-CCNC: 12 IU/L (ref 0–40)
BILIRUB DIRECT SERPL-MCNC: 0.15 MG/DL (ref 0–0.4)
BILIRUB SERPL-MCNC: 0.5 MG/DL (ref 0–1.2)
PROT SERPL-MCNC: 7.1 G/DL (ref 6–8.5)

## 2018-12-03 ENCOUNTER — OFFICE VISIT (OUTPATIENT)
Dept: FAMILY MEDICINE CLINIC | Age: 38
End: 2018-12-03

## 2018-12-03 VITALS
BODY MASS INDEX: 28.89 KG/M2 | HEART RATE: 76 BPM | OXYGEN SATURATION: 100 % | WEIGHT: 153 LBS | RESPIRATION RATE: 12 BRPM | SYSTOLIC BLOOD PRESSURE: 129 MMHG | TEMPERATURE: 98.7 F | DIASTOLIC BLOOD PRESSURE: 81 MMHG | HEIGHT: 61 IN

## 2018-12-03 DIAGNOSIS — Z23 NEED FOR VACCINATION: Primary | ICD-10-CM

## 2018-12-03 NOTE — PROGRESS NOTES
MOISES العراقي is a 41 y.o. female who presents for MMR and Varicella vaccination. Patient is going to nursing school. She was seen here in September and got titers done. She received the results and she said the paper said she was not immune  Per patient, she received the vaccines back when she was a child. PMHx-reviewed:  Past Medical History:   Diagnosis Date    Depression     Hepatitis     unknown type    Incontinentia pigmenti     Seizure ([address]      Meds-reviewed:   Current Outpatient Medications   Medication Sig Dispense Refill    isoniazid (NYDRAZID) 300 mg tablet Take 300 mg by mouth daily.  pyridoxine, vitamin B6, (VITAMIN B-6) 50 mg tablet Take 1 Tab by mouth daily. 30 Tab 9    ADACEL,TDAP ADOLESN/ADULT,,PF, 2 Lf-(2.5-5-3-5 mcg)-5Lf/0.5 mL syrg vaccine TO BE ADMINISTERED BY PHARMACIST FOR IMMUNIZATION  0    FLUZONE QUAD [id number], PF, syrg injection TO BE ADMINISTERED BY PHARMACIST FOR IMMUNIZATION  0    KAITLIB FE 0.8mg-25mcg(24) and 75 mg (4) chew CHEW 1 TABLET BY MOUTH EVERYDAY WITH OUT PILL FREE INTERVAL  4     Allergies-reviewed: Allergies   Allergen Reactions    Amoxicillin Nausea and Vomiting    Antihistamine [Triprolidine-Pseudoephedrine] Seizures       Smoker-reviewed:  Social History     Tobacco Use   Smoking Status Never Smoker   Smokeless Tobacco Never Used     ETOH-reviewed:   Social History     Substance and Sexual Activity   Alcohol Use No    Alcohol/week: 1.8 oz    Types: 3 Glasses of wine per week    Frequency: Never    Comment: 3xweek     FH-reviewed:   Family History   Problem Relation Age of Onset   [place] Breast Cancer Mother     Diabetes Mother     Thyroid Disease Mother     Melanoma Father      ROS:  Review of Systems   All other systems reviewed and are negative.     Physical Exam:  Visit Vitals  /81   Pulse 76   Temp 98.7 °F (37.1 °C) (Oral)   Resp 12   Ht 5' 1\" (1.549 m)   Wt 153 lb (69.4 kg)   SpO2 100%   BMI 28.91 kg/m²       Wt Readings from Last 3 Encounters:   12/03/18 153 lb (69.4 kg)   11/23/18 153 lb (69.4 kg)   10/22/18 156 lb (70.8 kg)     BP Readings from Last 3 Encounters:   12/03/18 129/81   11/23/18 119/73   11/05/18 116/78      Physical Exam   Constitutional: She appears well-developed and well-nourished. No distress. Cardiovascular: Normal rate, regular rhythm and normal heart sounds. Pulmonary/Chest: Effort normal and breath sounds normal. No respiratory distress. Skin: Skin is warm and dry. Assessment     41 y.o. female with:    ICD-10-CM ICD-9-CM    1. Need for vaccination Z23 V05.9               Plan     No orders of the defined types were placed in this encounter. 1- Patient here today for need for vaccination. On chart review, patient IT IS IMMUNE TO MMR AND VARICELLA. She does not need repeat vaccination. Labs results were printed and given to the patient. Patient discussed with Dr. Dennie Hockey     I have discussed the diagnosis with the patient and the intended plan as seen in the above orders. The patient has received an after-visit summary and questions were answered concerning future plans. I have discussed medication side effects and warnings with the patient as well.     Ambar Brewer MD  Family Medicine Resident

## 2018-12-03 NOTE — PROGRESS NOTES
I have reviewed the notes, assessments, and/or procedures performed, I concur with the residents documentation of Janette Sood.

## 2018-12-03 NOTE — PROGRESS NOTES
Chief Complaint   Patient presents with    Immunization/Injection     MMR & [facility] Maintenance Due   Topic    PAP AKA CERVICAL CYTOLOGY        Wt Readings from Last 3 Encounters:   12/03/18 153 lb (69.4 kg)   11/23/18 153 lb (69.4 kg)   10/22/18 156 lb (70.8 kg)     Temp Readings from Last 3 Encounters:   12/03/18 98.7 °F (37.1 °C) (Oral)   11/23/18 98.7 °F (37.1 °C) (Oral)   11/05/18 98.7 °F (37.1 °C) (Oral)     BP Readings from Last 3 Encounters:   12/03/18 129/81   11/23/18 119/73   11/05/18 116/78     Pulse Readings from Last 3 Encounters:   12/03/18 76   11/23/18 75   11/05/18 86         Learning Assessment:  :     No flowsheet data found. Depression Screening:  :     PHQ over the last two weeks 9/20/2018   Little interest or pleasure in doing things Not at all   Feeling down, depressed, irritable, or hopeless Not at all   Total Score PHQ 2 0       Fall Risk Assessment:  :     No flowsheet data found. Abuse Screening:  :     No flowsheet data found. Coordination of Care Questionnaire:  :     1) Have you been to an emergency room, urgent care clinic since your last visit? NO  Hospitalized since your last visit? NO               2) Have you seen or consulted any other [facility] providers outside of [address] [place] [address] since your last visit? NO    (Include any pap smears or colon screenings in this section.)  PAP SMEAR MAY 2018      Patient is accompanied by self I have received verbal consent from Graciela Jones to discuss any/all medical information while they are present in the room.

## 2018-12-04 ENCOUNTER — OFFICE VISIT (OUTPATIENT)
Dept: SURGERY | Age: 38
End: 2018-12-04

## 2018-12-04 VITALS
DIASTOLIC BLOOD PRESSURE: 83 MMHG | BODY MASS INDEX: 28.89 KG/M2 | SYSTOLIC BLOOD PRESSURE: 136 MMHG | HEART RATE: 77 BPM | WEIGHT: 153 LBS | HEIGHT: 61 IN

## 2018-12-04 DIAGNOSIS — L98.8 SKIN LESION OF BREAST: Primary | ICD-10-CM

## 2018-12-04 NOTE — PATIENT INSTRUCTIONS

## 2018-12-04 NOTE — PROGRESS NOTES
HISTORY OF PRESENT ILLNESS  Rona Edmonds is a 41 y.o. female. HPI NEW patient referred by Dr. Justyna Whitlock for RIGHT breast lesion, present for one year. Patient describes this as a \"burst blood vessel\" under the skin, purple/dark blue color. It hasn't changed but is tender at times, with an achy feeling after her bra is off at night. No palpable lumps by her or Dr. Justyna Whitlock, no asymmetry and denies any nipple discharge or inversion. Obstetric History     No data available   Obstetric Comments   Menarche 8, LMP 2/2018, # of children 0, age of 1st delivery N/A, Hysterectomy/oophorectomy No/No, Breast bx No, history of breast feeding NO, BCP Yes, Hormone therapy NO     Family history: Mother had breast cancer at 62 and recurrence a year later, survivor  Paternal grandfather had breast cancer at 66, survivor. No breast imaging done. Review of Systems   Constitutional: Negative. HENT: Negative. Eyes: Negative. Respiratory: Negative. Cardiovascular: Negative. Gastrointestinal: Negative. Genitourinary: Negative. Musculoskeletal: Negative. Skin: Negative. Neurological: Negative. Endo/Heme/Allergies: Negative. Psychiatric/Behavioral: Negative. Physical Exam   Pulmonary/Chest: Right breast exhibits mass (5 mm blue fluctuant mass medial breast 3:00 2/3). Right breast exhibits no inverted nipple, no nipple discharge, no skin change and no tenderness. Left breast exhibits no inverted nipple, no mass, no nipple discharge, no skin change and no tenderness. Breasts are symmetrical.       Lymphadenopathy:     She has no cervical adenopathy. She has no axillary adenopathy. Right: No supraclavicular adenopathy present. Left: No supraclavicular adenopathy present. Excision RIGHT breast skin lesion:  Anesthesia: 1% lidocaine with epi, 4 cc  Incision: 1 cm elliptical incision   The skin lesion was excised. Hemostasis with silver nitrate sticks.   Wound closed with a running 4-0 nylon suture and dressed with steristrips, gauze and tape. ASSESSMENT and PLAN    ICD-10-CM ICD-9-CM    1. Skin lesion of breast N64.9 611.9      Blue nevus excised.   Remove stitches in 10 days

## 2018-12-18 ENCOUNTER — OFFICE VISIT (OUTPATIENT)
Dept: SURGERY | Age: 38
End: 2018-12-18

## 2018-12-18 VITALS
BODY MASS INDEX: 29.07 KG/M2 | DIASTOLIC BLOOD PRESSURE: 70 MMHG | HEART RATE: 68 BPM | WEIGHT: 154 LBS | SYSTOLIC BLOOD PRESSURE: 118 MMHG | HEIGHT: 61 IN

## 2018-12-18 DIAGNOSIS — Z48.02 VISIT FOR SUTURE REMOVAL: Primary | ICD-10-CM

## 2018-12-18 NOTE — PROGRESS NOTES
HISTORY OF PRESENT ILLNESS  Liyah Gamez is a 41 y.o. female. HPI ESTABLISHED patient here for removal of sutures RIGHT breast. She had a skin lesion excised 12/4/2018 for a blue nevus. The patient states the sutures are intact but the incision does not appear to be closing. It is draining a tan fluid, and the patient needs to keep a dressing on covering the site. She denies any discomfort at the site. ROS    Physical Exam   Pulmonary/Chest: Right breast exhibits skin change (sutures removed. incision did not heal.  the wound is open 6 mm in width). Right breast exhibits no tenderness. ASSESSMENT and PLAN    ICD-10-CM ICD-9-CM    1.  Visit for suture removal Z48.02 V58.32      Sutures removed  Wound did not heal  No evidence of infection  Wound will heal secondarily  PRN follow up No

## 2018-12-18 NOTE — PATIENT INSTRUCTIONS

## 2019-02-15 ENCOUNTER — OFFICE VISIT (OUTPATIENT)
Dept: FAMILY MEDICINE CLINIC | Age: 39
End: 2019-02-15

## 2019-02-15 VITALS
BODY MASS INDEX: 28.32 KG/M2 | RESPIRATION RATE: 16 BRPM | SYSTOLIC BLOOD PRESSURE: 119 MMHG | HEIGHT: 61 IN | HEART RATE: 91 BPM | DIASTOLIC BLOOD PRESSURE: 74 MMHG | OXYGEN SATURATION: 98 % | WEIGHT: 150 LBS | TEMPERATURE: 98.5 F

## 2019-02-15 DIAGNOSIS — R76.11 POSITIVE PPD: Primary | ICD-10-CM

## 2019-02-15 DIAGNOSIS — Z79.899 LONG-TERM USE OF HIGH-RISK MEDICATION: ICD-10-CM

## 2019-02-15 DIAGNOSIS — Z23 ENCOUNTER FOR IMMUNIZATION: ICD-10-CM

## 2019-02-15 NOTE — PROGRESS NOTES
Identified pt with two pt identifiers(name and ). Chief Complaint   Patient presents with    Medication Evaluation     TB medications,     Immunization/Injection     needs 3rd Hep B    Form Completion     volunteer EMS forms         Health Maintenance Due   Topic    PAP AKA CERVICAL CYTOLOGY        Wt Readings from Last 3 Encounters:   18 154 lb (69.9 kg)   18 153 lb (69.4 kg)   18 153 lb (69.4 kg)     Temp Readings from Last 3 Encounters:   18 98.7 °F (37.1 °C) (Oral)   18 98.7 °F (37.1 °C) (Oral)   18 98.7 °F (37.1 °C) (Oral)     BP Readings from Last 3 Encounters:   18 118/70   18 136/83   18 129/81     Pulse Readings from Last 3 Encounters:   18 68   18 77   18 76         Learning Assessment:  :     Learning Assessment 2018   PRIMARY LEARNER Patient Patient   PRIMARY LANGUAGE ENGLISH ENGLISH   LEARNER PREFERENCE PRIMARY DEMONSTRATION DEMONSTRATION   ANSWERED BY patient patient   RELATIONSHIP SELF SELF       Depression Screening:  :     3 most recent PHQ Screens 2018   Little interest or pleasure in doing things Not at all   Feeling down, depressed, irritable, or hopeless Not at all   Total Score PHQ 2 0       Fall Risk Assessment:  :     No flowsheet data found. Abuse Screening:  :     No flowsheet data found. Coordination of Care Questionnaire:  :     1) Have you been to an emergency room, urgent care clinic since your last visit? no   Hospitalized since your last visit? no             2) Have you seen or consulted any other health care providers outside of [address] [place] [address] since your last visit? no  (Include any pap smears or colon screenings in this section.)    3) Do you have an Advance Directive on file?  no  Are you interested in receiving information about Advance Directives? no    Patient is accompanied by self I have received verbal consent from Harry Holcomb to discuss any/all medical information while they are present in the room. Reviewed record in preparation for visit and have obtained necessary documentation. Medication reconciliation up to date and corrected with patient at this time.

## 2019-02-15 NOTE — PROGRESS NOTES
Moris Hubbard is a 41 y.o. female who presents today for medication and lab follow-up. Positive PPD  Patient presents today for follow-up positive PPD currently undergoing treatment with Isoniazid. She is taking medication as prescribed. No side effects to the medication. She has been on the medication for 4 months. Denies cough, chest pain, sob, n/v, hemoptysis. No alcohol or cheese consumption. Overall, doing well. Hep B Vaccination  Patient presents today for hep B vaccination. Has had 2/3 previous vaccines. ROS: (positive in bold)  General: wt loss, fever, chills, fatigue   Skin: rashes  Cardiac: chest pain  Pul: SOB, dyspnea, cough, hemoptysis  GI: abdominal pain, N&V, diarrhea, constipation   MS:  myalgia  Neuro: parasthesias      Past Medical History:  Past Medical History:   Diagnosis Date    Depression     Hepatitis 2006    unknown type-got from exotic animal    Incontinentia pigmenti     Seizure ([address]     childhood, but stays away from antihistamines which are a trigger       Past Surgical History:  Past Surgical History:   Procedure Laterality Date    HX SKIN BIOPSY         Family History:  Family History   Problem Relation Age of Onset    Breast Cancer Mother         62, with reoccurence year later   Barrett Prior Diabetes Mother     Thyroid Disease Mother     Melanoma Father     Breast Cancer Paternal Grandfather 66       Allergies: Allergies   Allergen Reactions    Amoxicillin Nausea and Vomiting    Antihistamine [Triprolidine-Pseudoephedrine] Seizures       Social History:  Social History     Tobacco Use    Smoking status: Never Smoker    Smokeless tobacco: Never Used   Substance Use Topics    Alcohol use: No     Frequency: Never    Drug use: No       Current Meds:  Current Outpatient Medications on File Prior to Visit   Medication Sig Dispense Refill    isoniazid (NYDRAZID) 300 mg tablet Take 300 mg by mouth daily.       pyridoxine, vitamin B6, (VITAMIN B-6) 50 mg tablet Take 1 Tab by mouth daily. 30 Tab 9    KAITLIB FE 0.8mg-25mcg(24) and 75 mg (4) chew CHEW 1 TABLET BY MOUTH EVERYDAY WITH OUT PILL FREE INTERVAL  4     No current facility-administered medications on file prior to visit. Visit Vitals  /74 (BP 1 Location: Right arm, BP Patient Position: Sitting)   Pulse 91   Temp 98.5 °F (36.9 °C) (Oral)   Resp 16   Ht 5' 1\" (1.549 m)   Wt 150 lb (68 kg)   SpO2 98%   BMI 28.34 kg/m²       Gen:  Well developed, well nourished female in no acute distress  HEENT: normocephalic/atraumatic;EOMI  Card:  RRR, no m/r/g  Chest:  CTAB, no w/r/r  Abd:  BS+, Soft, nontender/nondistended  Extr:  2+ pulses BL, no LE edema   Psych:  Nl mood and affect     Assessment/Plan:      ICD-10-CM ICD-9-CM    1. Positive PPD R76.11 795.51    2. Long-term use of high-risk medication Z79.899 V58.69 HEPATIC FUNCTION PANEL      HEPATIC FUNCTION PANEL   3. Encounter for immunization Z23 V03.89 HEPATITIS B VACCINE, ADULT DOSAGE, IM      MO IMMUNIZ ADMIN,1 SINGLE/COMB VAC/TOXOID      Positive PPD currently undergoing INH therapy. Patient currently undergoing treatmet with Isoniazid for a positive PPD. Tolerating medication without difficulties. Will check LFTs as she has been on medication for 4 months. Continue current treatment plan. Will repeat LFT's every 2 months until completion of therapy. Will repeat CXR at end of therapy. She will need to be on Isoniazid for 6-9 months. Immunization  Patient received 3rd Hep B vaccination. Labs showed immunity to hep B, however she has elected to continue the final series of vaccination. I have discussed the diagnosis with the patient and the intended plan as seen in the above orders. The patient has received an after-visit summary and questions were answered concerning future plans. I have discussed medication side effects and warnings with the patient as well. The patient agrees and understands above plan.        Follow-up Disposition:  Return in about 3 months (around 5/15/2019). Patient discussed with supervising attending.     Yoselin Bazan DO

## 2019-02-16 LAB
ALBUMIN SERPL-MCNC: 4.9 G/DL (ref 3.5–5.5)
ALP SERPL-CCNC: 83 IU/L (ref 39–117)
ALT SERPL-CCNC: 11 IU/L (ref 0–32)
AST SERPL-CCNC: 16 IU/L (ref 0–40)
BILIRUB DIRECT SERPL-MCNC: 0.15 MG/DL (ref 0–0.4)
BILIRUB SERPL-MCNC: 0.5 MG/DL (ref 0–1.2)
PROT SERPL-MCNC: 7.3 G/DL (ref 6–8.5)

## 2019-03-28 NOTE — TELEPHONE ENCOUNTER
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 MMR, varicella show immunity. Hep B showed no immunity. She will need 3 part Hep B vaccination series at 0, 1 and 6 months. She is okay to get first vaccination at next appointment. She received influenza and Tdap at pharmacy and will bring results with her. UDS negative. Once 2nd ppd is read forms will be completed. Medications/Imaging Studies

## 2019-08-21 ENCOUNTER — OFFICE VISIT (OUTPATIENT)
Dept: FAMILY MEDICINE CLINIC | Age: 39
End: 2019-08-21

## 2019-08-21 VITALS
WEIGHT: 144 LBS | OXYGEN SATURATION: 95 % | TEMPERATURE: 98.8 F | HEART RATE: 82 BPM | RESPIRATION RATE: 16 BRPM | HEIGHT: 61 IN | SYSTOLIC BLOOD PRESSURE: 116 MMHG | DIASTOLIC BLOOD PRESSURE: 69 MMHG | BODY MASS INDEX: 27.19 KG/M2

## 2019-08-21 DIAGNOSIS — T88.7XXA SIDE EFFECT OF MEDICATION: Primary | ICD-10-CM

## 2019-08-21 DIAGNOSIS — Z22.7 TB LUNG, LATENT: ICD-10-CM

## 2019-08-21 NOTE — PROGRESS NOTES
Assessment and Plan    1. TB lung, latent  Treated with 9 months of INH. Took consistently  No side effects  Had + PPD advised not to repeat this test  We discussed that Quantiferon test is not recommended for repeat testing for cure and she may still be positive   This is complicated by the need for yearly school testing  We will wait until school requires this test to do so. This may give her more time to convert to negative  She may need CXR for school to see she does not have disease. 2. Side effect of medication  - HEPATIC FUNCTION PANEL      Follow-up and Dispositions    · Return in about 3 months (around 11/21/2019) for to repeat quantiferon for nursing school testing. Diagnosis and plan discussed with patient who verbillized understanding. History of present Daria Madison is a 42 y.o. 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 Review of Systems   Respiratory: Negative. Cardiovascular: Negative. Gastrointestinal: Negative. Genitourinary: Negative.           Past Medical History:   Diagnosis Date    Depression     Hepatitis 2006    unknown type-got from exotic animal    Incontinentia pigmenti     Seizure ([address]     childhood, but stays away from antihistamines which are a trigger     Past Surgical History:   Procedure Laterality Date    HX SKIN BIOPSY       Family History   Problem Relation Age of Onset    Breast Cancer Mother         62, with reoccurence year later   Amanda Ling Diabetes Mother     Thyroid Disease Mother     Melanoma Father     Breast Cancer Paternal Grandfather 66     Social History     Socioeconomic History    Marital status:      Spouse name: Not on file    Number of children: Not on file    Years of education: Not on file    Highest education level: Not on file   Occupational History    Not on file   Social Needs    Financial resource strain: Not on file    Food insecurity:     Worry: Not on file     Inability: Not on file    Transportation needs:     Medical: Not on file     Non-medical: Not on file   Tobacco Use    Smoking status: Never Smoker    Smokeless tobacco: Never Used   Substance and Sexual Activity    Alcohol use: No     Frequency: Never    Drug use: No    Sexual activity: Yes     Partners: Male   Lifestyle    Physical activity:     Days per week: Not on file     Minutes per session: Not on file    Stress: Not on file   Relationships    Social connections:     Talks on phone: Not on file     Gets together: Not on file     Attends Baptism service: Not on file     Active member of club or organization: Not on file     Attends meetings of clubs or organizations: Not on file     Relationship status: Not on file    Intimate partner violence:     Fear of current or ex partner: Not on file     Emotionally abused: Not on file     Physically abused: Not on file     Forced sexual activity: Not on file   Other Topics Concern    Not on file   Social History Narrative    Not on file         Current Outpatient Medications   Medication Sig Dispense Refill    KAITLIB FE 0.8mg-25mcg(24) and 75 mg (4) chew CHEW 1 TABLET BY MOUTH EVERYDAY WITH OUT PILL FREE INTERVAL  4    isoniazid (NYDRAZID) 300 mg tablet Take 300 mg by mouth daily. Indications: not taking      pyridoxine, vitamin B6, (VITAMIN B-6) 50 mg tablet Take 1 Tab by mouth daily. (Patient taking differently: Take 50 mg by mouth daily.  Indications: not taking) 30 Tab 9         Allergies   Allergen Reactions    Amoxicillin Nausea and Vomiting    Antihistamine [Triprolidine-Pseudoephedrine] Seizures       Vitals:    08/21/19 1433   BP: 116/69   Pulse: 82   Resp: 16   Temp: 98.8 °F (37.1 °C)   TempSrc: Oral   SpO2: 95%   Weight: 144 lb (65.3 kg)   Height: 5' 1\" (1.549 m)     Body mass index is 27.21 kg/m². Objective  General Well appearing, A&O X 4  Neck without nodes normal thyroid  Lungs clear to ausculation  CV RRR, No M, R, or G. No edema. Neuro Normal Speech  Psych Oriented to person place and time with normal affect and mood.   No hallucinations or abnormal thought

## 2019-08-22 LAB
ALBUMIN SERPL-MCNC: 4.7 G/DL (ref 3.5–5.5)
ALP SERPL-CCNC: 74 IU/L (ref 39–117)
ALT SERPL-CCNC: 11 IU/L (ref 0–32)
AST SERPL-CCNC: 12 IU/L (ref 0–40)
BILIRUB DIRECT SERPL-MCNC: 0.15 MG/DL (ref 0–0.4)
BILIRUB SERPL-MCNC: 0.5 MG/DL (ref 0–1.2)
PROT SERPL-MCNC: 7.1 G/DL (ref 6–8.5)

## 2019-09-03 ENCOUNTER — TELEPHONE (OUTPATIENT)
Dept: ENDOCRINOLOGY | Age: 39
End: 2019-09-03

## 2019-09-03 NOTE — TELEPHONE ENCOUNTER
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   They needs this done before the 20th of Sept.

## 2019-09-04 DIAGNOSIS — Z22.7 TB LUNG, LATENT: Primary | ICD-10-CM

## 2019-09-13 LAB
GAMMA INTERFERON BACKGROUND BLD IA-ACNC: 0.02 IU/ML
M TB IFN-G BLD-IMP: NEGATIVE
M TB IFN-G CD4+ BCKGRND COR BLD-ACNC: 0.03 IU/ML
MITOGEN IGNF BLD-ACNC: >10 IU/ML
QUANTIFERON INCUBATION, QF1T: NORMAL
QUANTIFERON TB2 AG: 0.03 IU/ML
SERVICE CMNT-IMP: NORMAL

## 2019-09-25 ENCOUNTER — OFFICE VISIT (OUTPATIENT)
Dept: FAMILY MEDICINE CLINIC | Age: 39
End: 2019-09-25

## 2019-09-25 VITALS
SYSTOLIC BLOOD PRESSURE: 115 MMHG | WEIGHT: 143.5 LBS | HEIGHT: 61 IN | BODY MASS INDEX: 27.09 KG/M2 | RESPIRATION RATE: 16 BRPM | TEMPERATURE: 99.1 F | DIASTOLIC BLOOD PRESSURE: 76 MMHG | OXYGEN SATURATION: 97 % | HEART RATE: 73 BPM

## 2019-09-25 DIAGNOSIS — Z00.00 ANNUAL PHYSICAL EXAM: Primary | ICD-10-CM

## 2019-09-25 NOTE — PROGRESS NOTES
Gus Shaw is a 42 y.o. female      Chief Complaint   Patient presents with    Complete Physical     School forms          1. Have you been to the ER, urgent care clinic since your last visit? Hospitalized since your last visit? No      2. Have you seen or consulted any other health care providers outside of the [address] [place] [address] since your last visit? Include any pap smears or colon screening.   No    Last pap 3 months ( normal )

## 2019-09-25 NOTE — PATIENT INSTRUCTIONS
Skin cancer prevention It's possible to prevent skin cancer. Current recommendations include avoiding sun in the peak hours of the day, wearing hats and long sleeves in the sun and using sun blocks regularly. Patient with sun damaged skin or previously skin cancer should get yearly skin exams. Also, there is very good evidence that Vitamin B3 in the form of Nicotinamide 500mg twice a day prevents non melanoma skin cancers and lowers the rate of those cancers 20-30% over time. Nicotinamide (which is the same as Niacinamide) is available on line and is relatively inexpensive. It is also found in many B complex vitamins. No prescription is necessary for Nicotinamide.

## 2019-09-25 NOTE — PROGRESS NOTES
Assessment and Plan:    1. Annual physical exam  Work safety discussed, universal precautions. Diagnoses and plans were discussed with the patient. After visit summary given to the patient as well. Abhishek Guevara MD    Geraldine Huber is a 42 y.o. female who had concerns including Complete Physical (School forms ). Health maintenance: for nursing school, RN program  Finished 9 months of INH    Immunizations needed: up to date and had flu shot   Tdap or DT   Pneumovacc 23   Prevnar 13   Shingrix   Influenza vaccine    Cancer screening status   Seeing GYN for yearly exam and paps    Cardiovascular risk factors: no   Smoking   HTN   Cholesterol   Diabetes   Chronic kidney disease   Family History    Last eye exam: current  Last dental exam: overdue    Meds:    Current Outpatient Medications:     KAITLIB FE 0.8mg-25mcg(24) and 75 mg (4) chew, CHEW 1 TABLET BY MOUTH EVERYDAY WITH OUT PILL FREE INTERVAL, Disp: , Rfl: 4   Allergies:   Allergies   Allergen Reactions    Amoxicillin Nausea and Vomiting    Antihistamine [Triprolidine-Pseudoephedrine] Seizures     Smoker:   Social History     Tobacco Use   Smoking Status Never Smoker   Smokeless Tobacco Never Used     ETOH:   Social History     Substance and Sexual Activity   Alcohol Use No    Frequency: Never       FH:    Family History   Problem Relation Age of Onset   Unruly Flax Breast Cancer Mother         62, with reoccurence year later   Unruly Flax Diabetes Mother     Thyroid Disease Mother     Melanoma Father     Breast Cancer Paternal Grandfather 66       ROS:  General/Constitutional:  No headache, fever, fatigue, weight loss or weight gain     Eyes:  No redness, pruritis, pain, visual changes, swelling, or discharge    Ears:  No pain, loss or changes in hearin    Neck:  No swelling, masses, stiffness, pain, or limited movemen    Cardiac:   No chest pain    Respiratory:  No cough or shortness of breath    GI:  No nausea/vomiting, diarrhea, abdominal pain, bloody or dark stools     :  No dysuria or  hematuria   Neurological:  No loss of consciousness, dizziness, seizures, dysarthria, cognitive changes, memory changes,  problems with balance, or unilateral weakness    Skin: No rash         Physical Exam:  Visit Vitals  /76   Pulse 73   Temp 99.1 °F (37.3 °C) (Oral)   Resp 16   Ht 5' 1\" (1.549 m)   Wt 143 lb 8 oz (65.1 kg)   SpO2 97%   BMI 27.11 kg/m²       Physical Examination:   General appearance - alert, well appearing, and in no distress, oriented to person, place, and time and normal appearing weight  Mental status -  normal mood, behavior, speech, dress, motor activity, and thought processes, no expressed suicidal or homicidal ideation, no hallucinations  Ears - bilateral TM's and external ear canals normal  Nose - normal and patent, no erythema, discharge or polyps  Mouth - mucous membranes moist, pharynx normal without lesions  Neck - supple, no significant adenopathy  Breast-sees GYN  Chest - normal chest excursion, normal inspiratory and expiratory function. Clear to ausculation bilaterally. Heart - normal rate, regular rhythm, normal S1, S2, no murmurs, rubs, clicks or gallops, no extremity edema  Abdomen- benign, no organomegaly or masses  GYN-sees GYN  Skin-no rashes or suspicious moles  Neuro normal speech, moves all extremities, normal gait  Musculoskeletal- grossly normal joint and motor function. d to person, place, and time and normal appearing weight

## 2020-01-07 ENCOUNTER — TELEPHONE (OUTPATIENT)
Dept: FAMILY MEDICINE CLINIC | Age: 40
End: 2020-01-07

## 2020-01-07 DIAGNOSIS — Z23 NEED FOR IMMUNIZATION AGAINST VIRAL HEPATITIS: Primary | ICD-10-CM

## 2020-01-09 LAB — HBV SURFACE AB SER QL: REACTIVE

## 2020-01-09 NOTE — PROGRESS NOTES
Your test shows that you are immune to Hep B. Let us know if you need a printed copy.   [facility]

## 2020-10-06 ENCOUNTER — OFFICE VISIT (OUTPATIENT)
Dept: FAMILY MEDICINE CLINIC | Age: 40
End: 2020-10-06
Payer: COMMERCIAL

## 2020-10-06 VITALS
OXYGEN SATURATION: 100 % | HEIGHT: 61 IN | SYSTOLIC BLOOD PRESSURE: 137 MMHG | WEIGHT: 138.8 LBS | BODY MASS INDEX: 26.21 KG/M2 | TEMPERATURE: 99.8 F | RESPIRATION RATE: 16 BRPM | DIASTOLIC BLOOD PRESSURE: 90 MMHG | HEART RATE: 82 BPM

## 2020-10-06 DIAGNOSIS — N30.90 CYSTITIS: ICD-10-CM

## 2020-10-06 DIAGNOSIS — K52.9 COLITIS: Primary | ICD-10-CM

## 2020-10-06 LAB
ALBUMIN SERPL-MCNC: 4.5 G/DL (ref 3.5–5)
ALBUMIN/GLOB SERPL: 1.5 {RATIO} (ref 1.1–2.2)
ALP SERPL-CCNC: 71 U/L (ref 45–117)
ALT SERPL-CCNC: 17 U/L (ref 12–78)
ANION GAP SERPL CALC-SCNC: 6 MMOL/L (ref 5–15)
APPEARANCE UR: CLEAR
AST SERPL-CCNC: 10 U/L (ref 15–37)
BASOPHILS # BLD: 0 K/UL (ref 0–0.1)
BASOPHILS NFR BLD: 0 % (ref 0–1)
BILIRUB SERPL-MCNC: 0.6 MG/DL (ref 0.2–1)
BILIRUB UR QL: NEGATIVE
BUN SERPL-MCNC: 8 MG/DL (ref 6–20)
BUN/CREAT SERPL: 13 (ref 12–20)
CALCIUM SERPL-MCNC: 9.5 MG/DL (ref 8.5–10.1)
CHLORIDE SERPL-SCNC: 104 MMOL/L (ref 97–108)
CO2 SERPL-SCNC: 27 MMOL/L (ref 21–32)
COLOR UR: NORMAL
CREAT SERPL-MCNC: 0.63 MG/DL (ref 0.55–1.02)
DIFFERENTIAL METHOD BLD: ABNORMAL
EOSINOPHIL # BLD: 0.1 K/UL (ref 0–0.4)
EOSINOPHIL NFR BLD: 1 % (ref 0–7)
ERYTHROCYTE [DISTWIDTH] IN BLOOD BY AUTOMATED COUNT: 11.8 % (ref 11.5–14.5)
GLOBULIN SER CALC-MCNC: 3 G/DL (ref 2–4)
GLUCOSE SERPL-MCNC: 86 MG/DL (ref 65–100)
GLUCOSE UR STRIP.AUTO-MCNC: NEGATIVE MG/DL
HCT VFR BLD AUTO: 43.4 % (ref 35–47)
HGB BLD-MCNC: 14.4 G/DL (ref 11.5–16)
HGB UR QL STRIP: NEGATIVE
IMM GRANULOCYTES # BLD AUTO: 0 K/UL (ref 0–0.04)
IMM GRANULOCYTES NFR BLD AUTO: 0 % (ref 0–0.5)
KETONES UR QL STRIP.AUTO: NEGATIVE MG/DL
LEUKOCYTE ESTERASE UR QL STRIP.AUTO: NEGATIVE
LYMPHOCYTES # BLD: 2 K/UL (ref 0.8–3.5)
LYMPHOCYTES NFR BLD: 31 % (ref 12–49)
MCH RBC QN AUTO: 32.1 PG (ref 26–34)
MCHC RBC AUTO-ENTMCNC: 33.2 G/DL (ref 30–36.5)
MCV RBC AUTO: 96.9 FL (ref 80–99)
MONOCYTES # BLD: 0.2 K/UL (ref 0–1)
MONOCYTES NFR BLD: 3 % (ref 5–13)
NEUTS SEG # BLD: 4.1 K/UL (ref 1.8–8)
NEUTS SEG NFR BLD: 65 % (ref 32–75)
NITRITE UR QL STRIP.AUTO: NEGATIVE
NRBC # BLD: 0 K/UL (ref 0–0.01)
NRBC BLD-RTO: 0 PER 100 WBC
PH UR STRIP: 6.5 [PH] (ref 5–8)
PLATELET # BLD AUTO: 391 K/UL (ref 150–400)
PMV BLD AUTO: 9.1 FL (ref 8.9–12.9)
POTASSIUM SERPL-SCNC: 4.1 MMOL/L (ref 3.5–5.1)
PROT SERPL-MCNC: 7.5 G/DL (ref 6.4–8.2)
PROT UR STRIP-MCNC: NEGATIVE MG/DL
RBC # BLD AUTO: 4.48 M/UL (ref 3.8–5.2)
SODIUM SERPL-SCNC: 137 MMOL/L (ref 136–145)
SP GR UR REFRACTOMETRY: 1.01 (ref 1–1.03)
UROBILINOGEN UR QL STRIP.AUTO: 0.2 EU/DL (ref 0.2–1)
WBC # BLD AUTO: 6.4 K/UL (ref 3.6–11)

## 2020-10-06 PROCEDURE — 99214 OFFICE O/P EST MOD 30 MIN: CPT | Performed by: FAMILY MEDICINE

## 2020-10-06 RX ORDER — METRONIDAZOLE 500 MG/1
TABLET ORAL
COMMUNITY
Start: 2020-10-02 | End: 2020-10-30 | Stop reason: ALTCHOICE

## 2020-10-06 RX ORDER — CIPROFLOXACIN 250 MG/1
TABLET, FILM COATED ORAL
COMMUNITY
Start: 2020-10-02 | End: 2020-10-30 | Stop reason: ALTCHOICE

## 2020-10-06 NOTE — PROGRESS NOTES
Identified pt with two pt identifiers(name and ). Reviewed record in preparation for visit and have obtained necessary documentation. Chief Complaint   Patient presents with   [facility] Follow Up     Follow-up ER visit         [facility] Maintenance Due   Topic    PAP AKA CERVICAL CYTOLOGY        Coordination of Care Questionnaire:  :   1) Have you been to an emergency room, urgent care, or hospitalized since your last visit? If yes, where when, and reason for visit? Yes 10/02/2020       2. Have seen or consulted any other health care provider since your last visit? If yes, where when, and reason for visit?   No

## 2020-10-06 NOTE — PROGRESS NOTES
Assessment and Plan    1. Colitis  Finish antibiotics  If sx resolve, no additional rx,  If not resolved, will send to GI for eval and consideration of colonoscopy  - CBC WITH AUTOMATED DIFF; Future  - METABOLIC PANEL, COMPREHENSIVE; Future    2. Cystitis  No real urinary sx during this, check UA C&S  - URINALYSIS W/ RFLX MICROSCOPIC; Future  - CULTURE, URINE; Future    3. Abnormal CT of bony pelvis with sclerosis  Will see if we can get Radiology to look at CT of her abdomen and give guidance on what to do in this regard. Follow-up and Dispositions    · Return in about 1 month (around 11/6/2020) for if diarrhea and urinary sx unresolved. .          Diagnosis and plan discussed with patient who verbillized understanding. History of present Karan Romano is a 47 y.o. female presenting for Hospital Follow Up (Follow-up ER visit )    Seen in ER in [place] (near [place]) 10/2  Colitis and cystitis was diagnosis in ER. Sclerosis of the bone and told she could have had   Lower quadrant pain both sides  Found grandfather dead at home (had gone to visit him)  Had diarrhea as well  Had a root canal a month ago and took CIPRO then  Was told bladder wall looked thick  Taking CIPRO and Flagyl  Was having no urinary sx when dx was made other than perhaps some increased frequency  NOW, the diarrhea has decreased. 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 Meds reconciled and DC papers reviewed    Review of Systems   Constitutional: Negative for chills and fever. HENT: Negative for congestion and sore throat. Respiratory: Negative for cough and shortness of breath. Cardiovascular: Negative for chest pain. Gastrointestinal: Negative for abdominal pain, constipation and diarrhea. Genitourinary: Positive for frequency.  Negative for dysuria, hematuria and urgency. Musculoskeletal: Negative for back pain and joint pain. Psychiatric/Behavioral: Negative. Past Medical History:   Diagnosis Date    Depression     Family history of breast cancer     Negative genetic testing for BRRC gene    Hepatitis 2006    unknown type-got from exotic animal    Incontinentia pigmenti     associated with seizures.  Seizure ([address]     childhood, but stays away from antihistamines which are a trigger. Last seizure age 7.   Phenytoin in past.    TB lung, latent     2019, treated with 9 months of INH     Past Surgical History:   Procedure Laterality Date    HX SKIN BIOPSY       Family History   Problem Relation Age of Onset   Eyvonne Clonts Breast Cancer Mother         62, with reoccurence year later   Eyvonne Clonts Diabetes Mother     Thyroid Disease Mother     Melanoma Father     Breast Cancer Paternal Grandfather 66     Social History     Socioeconomic History    Marital status:      Spouse name: Not on file    Number of children: Not on file    Years of education: Not on file    Highest education level: Not on file   Occupational History    Not on file   Social Needs    Financial resource strain: Not on file    Food insecurity     Worry: Not on file     Inability: Not on file    Transportation needs     Medical: Not on file     Non-medical: Not on file   Tobacco Use    Smoking status: Never Smoker    Smokeless tobacco: Never Used   Substance and Sexual Activity    Alcohol use: No     Frequency: Never    Drug use: No    Sexual activity: Yes     Partners: Male   Lifestyle    Physical activity     Days per week: Not on file     Minutes per session: Not on file    Stress: Not on file   Relationships    Social connections     Talks on phone: Not on file     Gets together: Not on file     Attends Judaism service: Not on file     Active member of club or organization: Not on file     Attends meetings of clubs or organizations: Not on file Relationship status: Not on file    Intimate partner violence     Fear of current or ex partner: Not on file     Emotionally abused: Not on file     Physically abused: Not on file     Forced sexual activity: Not on file   Other Topics Concern    Not on file   Social History Narrative    Not on file         Prior to Admission medications    Medication Sig Start Date End Date Taking? Authorizing Provider   ciprofloxacin HCl (CIPRO) 250 mg tablet TAKE 2 TABLETS BY MOUTH EVERY 12 HOURS FOR 10 DAYS 10/2/20  Yes Provider, Historical   metroNIDAZOLE (FLAGYL) 500 mg tablet  10/2/20  Yes Provider, Historical   KAITLIB FE 0.8mg-25mcg(24) and 75 mg (4) chew CHEW 1 TABLET BY MOUTH EVERYDAY WITH OUT PILL FREE INTERVAL 7/16/18  Yes Provider, Historical        Allergies   Allergen Reactions    Amoxicillin Nausea and Vomiting    Antihistamine [Triprolidine-Pseudoephedrine] Seizures       Vitals:    10/06/20 1104   BP: (!) 137/90   Pulse: 82   Resp: 16   Temp: 99.8 °F (37.7 °C)   TempSrc: Oral   SpO2: 100%   Weight: 138 lb 12.8 oz (63 kg)   Height: 5' 1\" (1.549 m)     Body mass index is 26.23 kg/m². Objective  Physical Exam  Vitals signs and nursing note reviewed. Constitutional:       Appearance: Normal appearance. She is not toxic-appearing. HENT:      Head: Normocephalic and atraumatic. Neck:      Musculoskeletal: No muscular tenderness. Cardiovascular:      Rate and Rhythm: Normal rate and regular rhythm. Heart sounds: Normal heart sounds. No murmur. No gallop. Pulmonary:      Effort: Pulmonary effort is normal. No respiratory distress. Breath sounds: Normal breath sounds. No wheezing, rhonchi or rales. Abdominal:      General: Abdomen is flat. There is no distension. Palpations: There is no mass. Tenderness: There is no abdominal tenderness. There is no right CVA tenderness, left CVA tenderness, guarding or rebound. Lymphadenopathy:      Cervical: No cervical adenopathy. Neurological:      Mental Status: She is alert. Psychiatric:         Mood and Affect: Mood normal.         Behavior: Behavior normal.         Thought Content:  Thought content normal.         Judgment: Judgment normal.

## 2020-10-07 DIAGNOSIS — R93.5 ABNORMAL ABDOMINAL CT SCAN: Primary | ICD-10-CM

## 2020-10-07 LAB
BACTERIA SPEC CULT: NORMAL
SERVICE CMNT-IMP: NORMAL

## 2020-10-07 NOTE — PROGRESS NOTES
Called patient with normal labs  I took her CT CD to [facility] yesterday and had it reviewed by Radiology. They agree her bone images are abnormal and consistent with metabolic bone disease or even metastases. They did not think any other imaging would be helpful and thought bone biopsy might be ultimately needed for a diagnosis. Patient is to  copy of her CT and report. Should take it to appointment with Catherine Beasley, Dr. Jesus Laughlin. She has no known primary problem at this time. I communicated that I do think she should get cause of the bony abnormalities identified.   [facility]

## 2020-10-08 ENCOUNTER — OFFICE VISIT (OUTPATIENT)
Dept: ONCOLOGY | Age: 40
End: 2020-10-08
Payer: COMMERCIAL

## 2020-10-08 ENCOUNTER — TELEPHONE (OUTPATIENT)
Dept: ONCOLOGY | Age: 40
End: 2020-10-08

## 2020-10-08 VITALS
HEIGHT: 61 IN | RESPIRATION RATE: 14 BRPM | HEART RATE: 89 BPM | DIASTOLIC BLOOD PRESSURE: 95 MMHG | OXYGEN SATURATION: 100 % | TEMPERATURE: 97.9 F | WEIGHT: 138 LBS | BODY MASS INDEX: 26.06 KG/M2 | SYSTOLIC BLOOD PRESSURE: 146 MMHG

## 2020-10-08 DIAGNOSIS — R93.7 ABNORMAL BONE RADIOGRAPH: ICD-10-CM

## 2020-10-08 DIAGNOSIS — R93.7 ABNORMAL BONE RADIOGRAPH: Primary | ICD-10-CM

## 2020-10-08 LAB
25(OH)D3 SERPL-MCNC: 38.2 NG/ML (ref 30–100)
CALCIUM SERPL-MCNC: 9.2 MG/DL (ref 8.5–10.1)
COMMENT, HOLDF: NORMAL
CRP SERPL-MCNC: 0.59 MG/DL (ref 0–0.6)
ERYTHROCYTE [SEDIMENTATION RATE] IN BLOOD: 7 MM/HR (ref 0–20)
PTH-INTACT SERPL-MCNC: 33.4 PG/ML (ref 18.4–88)
SAMPLES BEING HELD,HOLD: NORMAL

## 2020-10-08 PROCEDURE — 99204 OFFICE O/P NEW MOD 45 MIN: CPT | Performed by: INTERNAL MEDICINE

## 2020-10-08 RX ORDER — BISMUTH SUBSALICYLATE 262 MG
1 TABLET,CHEWABLE ORAL DAILY
COMMUNITY

## 2020-10-08 NOTE — PROGRESS NOTES
[facility]  [address], [address]  [facility] [place]: [phone]  F: [phone]      Reason for Visit:   Jorje Koyanagi is a 47 y.o. female who is seen in consultation at the request of Dr. Jagdish Bates for evaluation of abnormal imaging. History of Present Illness:   Jorje Koyanagi is a pleasant 47 y.o. female who presents today for evaluation of abnormal bone imaging. She was in [place] visiting family and went to the ED complaining of abdominal pain and diarrhea. These symptoms had been present for about a week. She had a CT scan done and was diagnosed with cystitis and colitis, treated with abx which she remains on currently. The CT additionally demonstrated with abnormal bone findings for which I have been consulted. Her symptoms have mostly resolved. Some slight loose stool, but improving. Repeat UA with PCP was normal.  She denies bone pain. Some arthritis pains at time. She denies frequent rashes or hives. No dyspnea or cough. No pruritis. Denies recurrent infections. No fevers, chills, night sweats, unintentional weight loss, adenopathy. She had latent Tb last year, treated with abx. She has a rare genetic disorder known as incontinentia pigmenti. This caused seizures as a child. Weakness and lack of dexterity on left side. 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 She is accompanied by her  today. Past Medical History:   Diagnosis Date    Depression     Family history of breast cancer     Negative genetic testing for BRRC gene    Hepatitis 2006    unknown type-got from exotic animal    Incontinentia pigmenti     associated with seizures.  Seizure ([address]     childhood, but stays away from antihistamines which are a trigger. Last seizure age 7.   Phenytoin in past.    TB lung, latent     2019, treated with 9 months of INH      Past Surgical History:   Procedure Laterality Date  HX SKIN BIOPSY        Social History     Tobacco Use    Smoking status: Never Smoker    Smokeless tobacco: Never Used   Substance Use Topics    Alcohol use: No     Frequency: Never      Family History   Problem Relation Age of Onset   [address] Breast Cancer Mother         62, with reoccurence year later    Diabetes Mother     Thyroid Disease Mother     Melanoma Father     Breast Cancer Paternal Grandfather 66     Current Outpatient Medications   Medication Sig    multivitamin (ONE A DAY) tablet Take 1 Tab by mouth daily.  ciprofloxacin HCl (CIPRO) 250 mg tablet TAKE 2 TABLETS BY MOUTH EVERY 12 HOURS FOR 10 DAYS    metroNIDAZOLE (FLAGYL) 500 mg tablet     KAITLIB FE 0.8mg-25mcg(24) and 75 mg (4) chew CHEW 1 TABLET BY MOUTH EVERYDAY WITH OUT PILL FREE INTERVAL     No current facility-administered medications for this visit. Allergies   Allergen Reactions    Amoxicillin Nausea and Vomiting    Antihistamine [Triprolidine-Pseudoephedrine] Seizures        Review of Systems: A complete review of systems was obtained, negative except as described above. Physical Exam:     Visit Vitals  BP (!) 146/95 (BP 1 Location: Right arm, BP Patient Position: Sitting)   Pulse 89   Temp 97.9 °F (36.6 °C) (Temporal)   Resp 14   Ht 5' 1\" (1.549 m)   Wt 138 lb (62.6 kg)   SpO2 100%   BMI 26.07 kg/m²     General: No distress  Eyes: PERRLA, anicteric sclerae  HENT: Atraumatic, OP clear  Neck: Supple  Lymphatic: No cervical, supraclavicular, or inguinal adenopathy  CV: Normal rate, regular rhythm, no murmurs, no peripheral edema  GI: Soft, nontender, nondistended, no masses, no hepatomegaly, no splenomegaly  MS: Normal gait and station. Digits without clubbing or cyanosis. Skin: No rashes, ecchymoses, or petechiae. Normal temperature, turgor, and texture.   Psych: Alert, oriented, appropriate affect, normal judgment/insight    Results:     Lab Results   Component Value Date/Time    WBC 6.4 10/06/2020 11:59 AM    HGB 14.4 10/06/2020 11:59 AM    HCT 43.4 10/06/2020 11:59 AM    PLATELET 531 37/63/4931 11:59 AM    MCV 96.9 10/06/2020 11:59 AM    ABS. NEUTROPHILS 4.1 10/06/2020 11:59 AM     Lab Results   Component Value Date/Time    Sodium 137 10/06/2020 11:59 AM    Potassium 4.1 10/06/2020 11:59 AM    Chloride 104 10/06/2020 11:59 AM    CO2 27 10/06/2020 11:59 AM    Glucose 86 10/06/2020 11:59 AM    BUN 8 10/06/2020 11:59 AM    Creatinine 0.63 10/06/2020 11:59 AM    GFR est AA >60 10/06/2020 11:59 AM    GFR est non-AA >60 10/06/2020 11:59 AM    Calcium 9.5 10/06/2020 11:59 AM     Lab Results   Component Value Date/Time    Bilirubin, total 0.6 10/06/2020 11:59 AM    ALT (SGPT) 17 10/06/2020 11:59 AM    Alk. phosphatase 71 10/06/2020 11:59 AM    Protein, total 7.5 10/06/2020 11:59 AM    Albumin 4.5 10/06/2020 11:59 AM    Globulin 3.0 10/06/2020 11:59 AM       CT A/P 10/2/2020:  Diffuse colitis likely of infectious etiology. Mild diffuse thickening of wall of urinary bladder. The appearance of the bones demonstrates multiple tiny diffuse sclerosis seen in all the involved bones. This could represent a diffuse infiltrating process like mastocytosis, tuberous sclerosis, or sarcoidosis. Other abnormalities like myelodysplasia or metastatic disease are less likely but cannot be excluded. Assessment:   1) Abnormal imaging of bones  A diffuse sclerotic process was noted throughout her bones on recent CT done in [place]. The significance of this is uncertain. Labwork looks good. She has no symptoms of mastocytosis or sarcoidosis. Normal CBC argues against MDS. Metastatic disease seems unlikely based on the diffuse nature, her age, lack of other disease on imaging, and lack of symptoms. I will submit her imaging to review with one of the bone radiologists. This may require a bone biopsy to diagnose.       Plan:     · Labs today: Tryptase, ACE, ESR, CRP, Vitamin D, PTH  · Review images with radiology  · We will call with plan    I appreciate the opportunity to participate in Ms. Vernadine Squibb care.     Signed By: Tino Simmonds, MD

## 2020-10-08 NOTE — TELEPHONE ENCOUNTER
[address] at [place]  [phone]    10/08/20- Patient provided imaging on a CD from [place], [place]. CT abd with pelvis w contrast from 10/2/20- sent to [facility] radiology department to be uploaded into our system.

## 2020-10-10 LAB — ACE SERPL-CCNC: 14 U/L (ref 14–82)

## 2020-10-11 LAB — TRYPTASE SERPL-MCNC: 107 UG/L (ref 2.2–13.2)

## 2020-10-12 ENCOUNTER — TELEPHONE (OUTPATIENT)
Dept: ONCOLOGY | Age: 40
End: 2020-10-12

## 2020-10-12 DIAGNOSIS — R74.8 ELEVATED SERUM TRYPTASE: ICD-10-CM

## 2020-10-12 DIAGNOSIS — M89.9 BONE LESION: Primary | ICD-10-CM

## 2020-10-12 NOTE — TELEPHONE ENCOUNTER
[address] at [facility]  [phone]    Labs reviewed and unremarkable with the exception of an elevated tryptase level. Radiologist in [place] had listed possibility of mastocytosis in their differential, and this lab would be consistent but not diagnostic. Imaging reviewed with bone radiologist, Dr. Shalonda Yanez. He recommends a bone marrow biopsy, which will hopefully sample both the bone marrow (to evaluate the mastocytosis possibility) and the bone lesions themselves. I called and discussed with the patient, she is agreeable. I will follow up with her in the office once we have results.

## 2020-10-14 ENCOUNTER — HOSPITAL ENCOUNTER (OUTPATIENT)
Dept: CT IMAGING | Age: 40
Discharge: HOME | End: 2020-10-14
Attending: INTERNAL MEDICINE
Payer: COMMERCIAL

## 2020-10-14 ENCOUNTER — TELEPHONE (OUTPATIENT)
Dept: ONCOLOGY | Age: 40
End: 2020-10-14

## 2020-10-14 VITALS
WEIGHT: 138 LBS | SYSTOLIC BLOOD PRESSURE: 106 MMHG | RESPIRATION RATE: 19 BRPM | HEIGHT: 61 IN | BODY MASS INDEX: 26.06 KG/M2 | DIASTOLIC BLOOD PRESSURE: 69 MMHG | HEART RATE: 77 BPM | OXYGEN SATURATION: 97 %

## 2020-10-14 DIAGNOSIS — R74.8 ELEVATED SERUM TRYPTASE: ICD-10-CM

## 2020-10-14 DIAGNOSIS — M89.9 BONE LESION: ICD-10-CM

## 2020-10-14 LAB
BASOPHILS # BLD: 0 K/UL (ref 0–0.1)
BASOPHILS NFR BLD: 0 % (ref 0–1)
DIFFERENTIAL METHOD BLD: ABNORMAL
EOSINOPHIL # BLD: 0.1 K/UL (ref 0–0.4)
EOSINOPHIL NFR BLD: 2 % (ref 0–7)
ERYTHROCYTE [DISTWIDTH] IN BLOOD BY AUTOMATED COUNT: 11.8 % (ref 11.5–14.5)
HCT VFR BLD AUTO: 39.8 % (ref 35–47)
HGB BLD-MCNC: 13.7 G/DL (ref 11.5–16)
IMM GRANULOCYTES # BLD AUTO: 0 K/UL (ref 0–0.04)
IMM GRANULOCYTES NFR BLD AUTO: 0 % (ref 0–0.5)
LYMPHOCYTES # BLD: 1.8 K/UL (ref 0.8–3.5)
LYMPHOCYTES NFR BLD: 29 % (ref 12–49)
MCH RBC QN AUTO: 32.5 PG (ref 26–34)
MCHC RBC AUTO-ENTMCNC: 34.4 G/DL (ref 30–36.5)
MCV RBC AUTO: 94.5 FL (ref 80–99)
MONOCYTES # BLD: 0.2 K/UL (ref 0–1)
MONOCYTES NFR BLD: 3 % (ref 5–13)
NEUTS SEG # BLD: 4 K/UL (ref 1.8–8)
NEUTS SEG NFR BLD: 66 % (ref 32–75)
NRBC # BLD: 0 K/UL (ref 0–0.01)
NRBC BLD-RTO: 0 PER 100 WBC
PLATELET # BLD AUTO: 333 K/UL (ref 150–400)
PMV BLD AUTO: 8.4 FL (ref 8.9–12.9)
RBC # BLD AUTO: 4.21 M/UL (ref 3.8–5.2)
WBC # BLD AUTO: 6.1 K/UL (ref 3.6–11)

## 2020-10-14 PROCEDURE — 88341 IMHCHEM/IMCYTCHM EA ADD ANTB: CPT

## 2020-10-14 PROCEDURE — 88342 IMHCHEM/IMCYTCHM 1ST ANTB: CPT

## 2020-10-14 PROCEDURE — 77030003375 HC MRK BIOP BEEK -A

## 2020-10-14 PROCEDURE — 88312 SPECIAL STAINS GROUP 1: CPT

## 2020-10-14 PROCEDURE — 88305 TISSUE EXAM BY PATHOLOGIST: CPT

## 2020-10-14 PROCEDURE — 77030014115

## 2020-10-14 PROCEDURE — 38221 DX BONE MARROW BIOPSIES: CPT

## 2020-10-14 PROCEDURE — 85025 COMPLETE CBC W/AUTO DIFF WBC: CPT

## 2020-10-14 PROCEDURE — 36415 COLL VENOUS BLD VENIPUNCTURE: CPT

## 2020-10-14 PROCEDURE — 74011250636 HC RX REV CODE- 250/636: Performed by: RADIOLOGY

## 2020-10-14 PROCEDURE — 88311 DECALCIFY TISSUE: CPT

## 2020-10-14 PROCEDURE — 88313 SPECIAL STAINS GROUP 2: CPT

## 2020-10-14 PROCEDURE — 99152 MOD SED SAME PHYS/QHP 5/>YRS: CPT

## 2020-10-14 PROCEDURE — 77030028872 HC BN BIOP NDL ON CNTRL TY TELE -C

## 2020-10-14 RX ORDER — FENTANYL CITRATE 50 UG/ML
100 INJECTION, SOLUTION INTRAMUSCULAR; INTRAVENOUS
Status: DISCONTINUED | OUTPATIENT
Start: 2020-10-14 | End: 2020-10-18 | Stop reason: HOSPADM

## 2020-10-14 RX ORDER — MIDAZOLAM HYDROCHLORIDE 1 MG/ML
5 INJECTION, SOLUTION INTRAMUSCULAR; INTRAVENOUS
Status: DISCONTINUED | OUTPATIENT
Start: 2020-10-14 | End: 2020-10-18 | Stop reason: HOSPADM

## 2020-10-14 RX ADMIN — MIDAZOLAM HYDROCHLORIDE 1 MG: 1 INJECTION, SOLUTION INTRAMUSCULAR; INTRAVENOUS at 10:54

## 2020-10-14 RX ADMIN — FENTANYL CITRATE 25 MCG: 50 INJECTION, SOLUTION INTRAMUSCULAR; INTRAVENOUS at 10:54

## 2020-10-14 RX ADMIN — FENTANYL CITRATE 25 MCG: 50 INJECTION, SOLUTION INTRAMUSCULAR; INTRAVENOUS at 11:01

## 2020-10-14 RX ADMIN — FENTANYL CITRATE 25 MCG: 50 INJECTION, SOLUTION INTRAMUSCULAR; INTRAVENOUS at 10:49

## 2020-10-14 RX ADMIN — FENTANYL CITRATE 25 MCG: 50 INJECTION, SOLUTION INTRAMUSCULAR; INTRAVENOUS at 10:58

## 2020-10-14 RX ADMIN — MIDAZOLAM HYDROCHLORIDE 1 MG: 1 INJECTION, SOLUTION INTRAMUSCULAR; INTRAVENOUS at 10:58

## 2020-10-14 RX ADMIN — MIDAZOLAM HYDROCHLORIDE 1 MG: 1 INJECTION, SOLUTION INTRAMUSCULAR; INTRAVENOUS at 11:01

## 2020-10-14 RX ADMIN — MIDAZOLAM HYDROCHLORIDE 1 MG: 1 INJECTION, SOLUTION INTRAMUSCULAR; INTRAVENOUS at 10:49

## 2020-10-14 NOTE — DISCHARGE INSTRUCTIONS
Bone Marrow Aspiration and Biopsy: What to Expect at Home  Your Recovery  The biopsy site may feel sore for several days. It can help to walk, take pain medicine, and put ice packs on the site. You will probably be able to return to work and your usual activities the day after the procedure. Your doctor or nurse will call you with the results of your test.  This care sheet gives you a general idea about how long it will take for you to recover. But each person recovers at a different pace. Follow the steps below to get better as quickly as possible. How can you care for yourself at home? Activity    · Rest when you feel tired. Getting enough sleep will help you recover. · You may drive when you are no longer taking pain pills and can quickly move your foot from the gas pedal to the brake. You must also be able to sit comfortably for a long period of time, even if you do not plan to go far. You might get caught in traffic. · Most people are able to return to work the day after the procedure. Medicines    · Your doctor will tell you if and when you can restart your medicines. He or she will also give you instructions about taking any new medicines. · If you take blood thinners, such as warfarin (Coumadin), clopidogrel (Plavix), or aspirin, be sure to talk to your doctor. He or she will tell you if and when to start taking those medicines again. Make sure that you understand exactly what your doctor wants you to do. · Be safe with medicines. Take pain medicines exactly as directed. ? If the doctor gave you a prescription medicine for pain, take it as prescribed. ? If you are not taking a prescription pain medicine, take an over-the-counter medicine such as acetaminophen (Tylenol), ibuprofen (Advil, Motrin), or naproxen (Aleve). Read and follow all instructions on the label. ? Do not take two or more pain medicines at the same time unless the doctor told you to.  Many pain medicines have acetaminophen, which is Tylenol. Too much acetaminophen (Tylenol) can be harmful. · If you think your pain medicine is making you sick to your stomach:  ? Take your medicine after meals (unless your doctor has told you not to). ? Ask your doctor for a different pain medicine. · If your doctor prescribed antibiotics, take them as directed. Do not stop taking them just because you feel better. Ice    · Put ice or a cold pack on the biopsy site for 10 to 20 minutes at a time. Put a thin cloth between the ice and your skin. Follow-up care is a key part of your treatment and safety. Be sure to make and go to all appointments, and call your doctor if you are having problems. It's also a good idea to know your test results and keep a list of the medicines you take. When should you call for help? Call 911 anytime you think you may need emergency care. For example, call if:    · You passed out (lost consciousness). Call your doctor now or seek immediate medical care if:    · You have signs of infection, such as:  ? Increased pain, swelling, warmth, or redness. ? Red streaks leading from the biopsy site. ? Pus draining from the biopsy site. ? Swollen lymph nodes in your neck, armpits, or groin. ? A fever. Watch closely for any changes in your health, and be sure to contact your doctor if:    · You are not getting better as expected. Where can you learn more? Go to http://www.gray.com/. Enter E148 in the search box to learn more about \"Bone Marrow Aspiration and Biopsy: What to Expect at Home. \"  Current as of: September 10, 2017  Content Version: 11.8  © 7439-9261 Healthwise, [employer]. Care instructions adapted under license by [employer] (which disclaims liability or warranty for this information).  If you have questions about a medical condition or this instruction, always ask your healthcare professional. Nehemiah Pavon disclaims any warranty or liability for your use of this information. If you have any questions or concerns please call [facility] at [phone] between the hours of 7:00 am and 3:30 pm or after hours call [phone]. Naeem Carpio RN, [facility]      Sedation for a Medical Procedure: After Your Visit  Instructions. Sedatives are used to relax you for a procedure. You may or may not be awake during the procedure. Common side effects of sedation medications include:                   Drowsiness, dizziness, euphoria, sleepiness or confusion. I              Unsteady gait. Loss of fine muscle control and delayed reaction time. Visual disturbances, difficulty focusing and blurred vision. Impaired memory recall. Follow-up care is a key component for your health and safety. Be sure to make and go to all medical appointments. Call your doctor if you are having problems. It's also a good idea to keep a list of your allergies, medicines with doses and test results on hand    Home care following your sedation procedure: You may experience some of these side effects or you may not be aware of subtle changes in your behavior or reaction time. Because you received these medications, we are giving you the following instructions: Activity    For your safety, you should not drive until the medicine wears off and you can think clearly and react easily. Do not drive for 24 hours.  Rest when you feel tired. Getting enough sleep will help you recover. Diet     You can eat your normal diet. If your stomach is upset, try bland, low-fat foods like plain rice, broiled chicken, toast, and yogurt.  Drink plenty of fluids unless your doctor advised you to limit your fluids.  Do not consume alcoholic beverages for 24 hours. Instructions   Do not make important personal, business, or legal decisions for 24 hours.    Move slowly and carefully, do not make sudden position changes.  Be alert for dizziness or lightheadedness and move accordingly.  Have a responsible person assist you.  Do not drive for 24 hours.  Do not operate equipment for 24 hours. [employer] mowers, power tools, kitchen appliances, etc.)    Discharge medications:   Resume prior to test medications as prescribed by your personal physician. If you have any questions or concerns call Radiology RN at [phone]  After hours call Radiology on-call at [phone], [facility]      Call 911 any time you think you may need emergency care. For example:                Call if you passed out (lost consciousness).  The medicine is not wearing off and you cannot think clearly. Watch closely for changes in your health, and be sure to contact your doctor if you have any problems. Where can you learn more? Go to [employer]   Enter G817 in the search box to learn more about \"Sedation for a Medical Procedure: After Your Visit. \"

## 2020-10-14 NOTE — PROGRESS NOTES
48 Pt brought back to [facility] for scheduled CT guided bone marrow biopsy. Confirmed NPO and ride. 1005 IV placed and labs drawn and sent to lab. Stevie Mobley.  1025  Dr Demetrio Lind in [facility] to discuss procedure and sedation plan with pt and RN. Allergies reviewed. Consent signed. [id number] Pt brought to CT for procedure. Sedation started at  1049. Time out performed at 1101. Procedure started at 1102  and ended at 1109. Pt received a total of  4mg of versed and 100mcg of fentanyl over the course of procedure. Pt tolerated procedure well. Denies pain. Dressing to procedure site CDI. VS monitored throughout procedure. 200  Pt returned to [facility]. Given crackers and coffee. 1120  brought back to [facility]. Sitting at bedside. [address] discharge instructions with patient. Opportunity given for questions. Pt verbalized understanding. Copy provided. 1205 IV removed. Pt ambulated to  to get dressed. 1210  Escorted pt out to vehicle in w/c for discharge to home with .

## 2020-10-14 NOTE — H&P
Radiology History and Physical    Patient: Meliton Dela Cruz 47 y.o. female       Chief Complaint: No chief complaint on file. 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  Seizure ([address]     childhood, but stays away from antihistamines which are a trigger. Last seizure age 7.   Phenytoin in past.    TB lung, latent     2019, treated with 9 months of INH     Family History   Problem Relation Age of Onset    Breast Cancer Mother         62, with reoccurence year later   [place] Diabetes Mother     Thyroid Disease Mother     Melanoma Father     Breast Cancer Paternal Grandfather 66     Social History     Socioeconomic History    Marital status:      Spouse name: Not on file    Number of children: Not on file    Years of education: Not on file    Highest education level: Not on file   Occupational History    Not on file   Social Needs    Financial resource strain: Not on file    Food insecurity     Worry: Not on file     Inability: Not on file    Transportation needs     Medical: Not on file     Non-medical: Not on file   Tobacco Use    Smoking status: Never Smoker    Smokeless tobacco: Never Used   Substance and Sexual Activity    Alcohol use: No     Frequency: Never    Drug use: No    Sexual activity: Yes     Partners: Male   Lifestyle    Physical activity     Days per week: Not on file     Minutes per session: Not on file    Stress: Not on file   Relationships    Social connections     Talks on phone: Not on file     Gets together: Not on file     Attends [employer] service: Not on file     Active member of club or organization: Not on file     Attends meetings of clubs or organizations: Not on file     Relationship status: Not on file  Intimate partner violence     Fear of current or ex partner: Not on file     Emotionally abused: Not on file     Physically abused: Not on file     Forced sexual activity: Not on file   Other Topics Concern    Not on file   Social History Narrative    Not on file       Allergies: Allergies   Allergen Reactions    Amoxicillin Nausea and Vomiting    Antihistamine [Triprolidine-Pseudoephedrine] Seizures       Current Medications:  Current Outpatient Medications   Medication Sig    multivitamin (ONE A DAY) tablet Take 1 Tab by mouth daily.  ciprofloxacin HCl (CIPRO) 250 mg tablet TAKE 2 TABLETS BY MOUTH EVERY 12 HOURS FOR 10 DAYS    metroNIDAZOLE (FLAGYL) 500 mg tablet     KAITLIB FE 0.8mg-25mcg(24) and 75 mg (4) chew CHEW 1 TABLET BY MOUTH EVERYDAY WITH OUT PILL FREE INTERVAL     Current Facility-Administered Medications   Medication Dose Route Frequency    fentaNYL citrate (PF) injection 100 mcg  100 mcg IntraVENous RAD PRN    midazolam (PF) (VERSED) injection 5 mg  5 mg IntraVENous RAD PRN        Physical Exam:  Blood pressure 123/71, pulse 74, resp. rate 10, height 5' 1\" (1.549 m), weight 62.6 kg (138 lb), SpO2 99 %. GENERAL: alert, cooperative, no distress, appears stated age, LUNG: clear to auscultation bilaterally, HEART: regular rate and rhythm, S1, S2 normal, no murmur, click, rub or gallop      Alerts:    Hospital Problems  Date Reviewed: 10/8/2020    None          Laboratory:    No results for input(s): HGB, HCT, WBC, PLT, INR, BUN, CREA, K, CRCLT, HGBEXT, HCTEXT, PLTEXT, INREXT in the last 72 hours. No lab exists for component: PTT, PT      Plan of Care/Planned Procedure:  Risks, benefits, and alternatives reviewed with patient and she agrees to proceed with the procedure.        Letha Barnett MD

## 2020-10-14 NOTE — PROGRESS NOTES
[facility] at [address], [address]  W: [phone]  F: [phone]      Reason for Visit:   Caleb Thompson is a 47 y.o. female who is seen for follow up of sclerotic bone lesions, mastocytosis. History of Present Illness:   She had her bone marrow biopsy and tolerated this well. She denies problems with rash or hives. Denies dyspnea. She does get a dry cough in the evenings. 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 She is accompanied by her  today. PAST HISTORY: The following sections were reviewed and updated in the EMR as appropriate: PMH, SH, FH, Medications, Allergies. Allergies   Allergen Reactions    Amoxicillin Nausea and Vomiting    Antihistamine [Triprolidine-Pseudoephedrine] Seizures      Review of Systems: A complete review of systems was obtained, reviewed, and scanned into the EMR. Pertinent findings reviewed above. Physical Exam:     Visit Vitals  /72 (BP 1 Location: Left arm, BP Patient Position: Sitting)   Pulse 74   Temp 97.8 °F (36.6 °C) (Temporal)   Resp 16   Ht 5' 1\" (1.549 m)   Wt 137 lb (62.1 kg)   SpO2 99%   BMI 25.89 kg/m²     General: No distress  Respiratory: Normal respiratory effort  CV: No peripheral edema  Skin: No rashes, ecchymoses, or petechiae  Psych: Alert, oriented, normal mood/affect      Results:     Lab Results   Component Value Date/Time    WBC 6.1 10/14/2020 10:07 AM    HGB 13.7 10/14/2020 10:07 AM    HCT 39.8 10/14/2020 10:07 AM    PLATELET 709 14/13/5735 10:07 AM    MCV 94.5 10/14/2020 10:07 AM    ABS.  NEUTROPHILS 4.0 10/14/2020 10:07 AM     Lab Results   Component Value Date/Time    Sodium 137 10/06/2020 11:59 AM    Potassium 4.1 10/06/2020 11:59 AM    Chloride 104 10/06/2020 11:59 AM    CO2 27 10/06/2020 11:59 AM    Glucose 86 10/06/2020 11:59 AM    BUN 8 10/06/2020 11:59 AM    Creatinine 0.63 10/06/2020 11:59 AM    GFR est AA >60 10/06/2020 11:59 AM    GFR est non-AA >60 10/06/2020 11:59 AM    Calcium 9.2 10/08/2020 03:36 PM     Lab Results   Component Value Date/Time    Bilirubin, total 0.6 10/06/2020 11:59 AM    ALT (SGPT) 17 10/06/2020 11:59 AM    Alk. phosphatase 71 10/06/2020 11:59 AM    Protein, total 7.5 10/06/2020 11:59 AM    Albumin 4.5 10/06/2020 11:59 AM    Globulin 3.0 10/06/2020 11:59 AM       CT A/P 10/2/2020:  Diffuse colitis likely of infectious etiology. Mild diffuse thickening of wall of urinary bladder. The appearance of the bones demonstrates multiple tiny diffuse sclerosis seen in all the involved bones. This could represent a diffuse infiltrating process like mastocytosis, tuberous sclerosis, or sarcoidosis. Other abnormalities like myelodysplasia or metastatic disease are less likely but cannot be excluded. Bone marrow biopsy 10/14/2020:  Bone marrow, posterior iliac crest, aspirate, clot section, touch imprint,   and decalcified core biopsy:        Normocellular marrow with multifocal dense mass cell infiltrates. See comment. Negative for morphologic or immunophenotypic evidence of dysplasia, acute   myeloid leukemia, lymphoma, or other hematological neoplasm. Comment  The findings of multifocal dense aggregates of mast cells (<30% of total   cells) with >25% atypical/spindle cell morphology along with elevated   serum tryptase is diagnostic of systemic mastocytosis. Correlation with   clinical findings including serum tryptase level, imaging, and evidence of   other organ involvement is recommended for further classification. Chromosome analysis and KIT mutation analysis are pending. Assessment:   1) Sclerotic bone lesions  Her CT from [place] was reviewed with a bone radiologist with the decision made to proceed with a bone biopsy / bone marrow biopsy. This has returned and is diagnostic of systemic mastocytosis, discussed below.     2) Systemic mastocytosis  This was an incidental finding, with diffuse sclerotic bone lesions noted at the time of a CT scan done for colitis, and subsequent bone marrow biopsy confirming the diagnosis. Tryptase was elevated at 107. She is completely asymptomatic. This is a rare diagnosis and my experience is limited. I recommend referral to an academic center, and she is agreeable. We will need to get her outside imaging and her bone marrow biopsy up to [facility] for their review. Ideally, the bone marrow should be reviewed there prior to her appointment, so the hematologist has these results. We attempted to proactively request the bone marrow to be reviewed prior to the visit, but we were told today by the intake person at [facility] that we could not do this and that they would initiate this process.     Plan:     · Referral to [facility] hematology      Signed By: Christine Galvan MD

## 2020-10-23 ENCOUNTER — OFFICE VISIT (OUTPATIENT)
Dept: FAMILY MEDICINE CLINIC | Age: 40
End: 2020-10-23
Payer: COMMERCIAL

## 2020-10-23 VITALS
BODY MASS INDEX: 26.7 KG/M2 | TEMPERATURE: 98.3 F | HEIGHT: 60 IN | OXYGEN SATURATION: 99 % | DIASTOLIC BLOOD PRESSURE: 84 MMHG | WEIGHT: 136 LBS | SYSTOLIC BLOOD PRESSURE: 125 MMHG | HEART RATE: 99 BPM | RESPIRATION RATE: 16 BRPM

## 2020-10-23 DIAGNOSIS — L98.9 SKIN LESION: ICD-10-CM

## 2020-10-23 DIAGNOSIS — K52.9 COLITIS: Primary | ICD-10-CM

## 2020-10-23 PROCEDURE — 99213 OFFICE O/P EST LOW 20 MIN: CPT | Performed by: NURSE PRACTITIONER

## 2020-10-23 NOTE — PROGRESS NOTES
1. Have you been to the ER, urgent care clinic since your last visit? Hospitalized since your last visit? Yes When: 10/2/2020 Where: ER located in [place] Reason for visit: Lower abdominal pain    2. Have you seen or consulted any other health care providers outside of the [address] [place] [address] since your last visit? Include any pap smears or colon screening. No     Chief Complaint   Patient presents with    Abdominal Pain     abdominal pain intermittent x2 wks. 3 most recent PHQ Screens 10/23/2020   Little interest or pleasure in doing things Not at all   Feeling down, depressed, irritable, or hopeless Not at all   Total Score PHQ 2 0     Pharmacy verified. [id number] - [place], [place] - [address]  AT [address]    Visit Vitals  /84 (BP 1 Location: Left arm, BP Patient Position: Sitting)   Pulse 99   Temp 98.3 °F (36.8 °C) (Oral)   Resp 16   Ht 5' (1.524 m)   Wt 136 lb (61.7 kg)   SpO2 99%   BMI 26.56 kg/m²

## 2020-10-30 ENCOUNTER — OFFICE VISIT (OUTPATIENT)
Dept: ONCOLOGY | Age: 40
End: 2020-10-30
Payer: COMMERCIAL

## 2020-10-30 ENCOUNTER — TELEPHONE (OUTPATIENT)
Dept: ONCOLOGY | Age: 40
End: 2020-10-30

## 2020-10-30 VITALS
TEMPERATURE: 97.8 F | SYSTOLIC BLOOD PRESSURE: 128 MMHG | WEIGHT: 137 LBS | DIASTOLIC BLOOD PRESSURE: 72 MMHG | RESPIRATION RATE: 16 BRPM | HEART RATE: 74 BPM | OXYGEN SATURATION: 99 % | BODY MASS INDEX: 25.86 KG/M2 | HEIGHT: 61 IN

## 2020-10-30 DIAGNOSIS — D47.09 MASTOCYTOSIS: ICD-10-CM

## 2020-10-30 DIAGNOSIS — D47.02 SYSTEMIC MASTOCYTOSIS: Primary | ICD-10-CM

## 2020-10-30 DIAGNOSIS — M89.9 BONE LESION: ICD-10-CM

## 2020-10-30 PROCEDURE — 99215 OFFICE O/P EST HI 40 MIN: CPT | Performed by: INTERNAL MEDICINE

## 2020-10-30 NOTE — PROGRESS NOTES
Dlefin Sharma is a 47 y.o. female follow up for sclerotic bone lesions. 1. Have you been to the ER, urgent care clinic since your last visit? Hospitalized since your last visit?no     2. Have you seen or consulted any other health care providers outside of the [address] [place] [address] since your last visit? Include any pap smears or colon screening.  no

## 2020-10-30 NOTE — TELEPHONE ENCOUNTER
[address] at [place]  [phone]    10/30/20- New referral placed to [facility] hematology for new diagnosis of systemic mastocytosis. Patient needs to be seen soon but needs to have pathology slides reviewed prior to appointment at [facility]. Phone call [phone] placed to [facility] spoke to  staff they requested for records to be faxed to [phone] first then they will reach out to patient to schedule NP appointment. Tried to get the address for path department to be proactive and have path specimen sent ahead of time but was advised they can no give that out. That once they receive records theu will request it and have to speak to patient to inform them that this is a billable service. MD informed. Records faxed to above number- fax confirmation delivery successful. 11/04/20- Phone call placed to [facility] NP line- spoke to Joanna she confirmed NP records received. She will have MD review records today and call patient with appointment- she will also let our office know of appointment as well. Phone call placed to patient to advise her CD from [address] [facility] should be picked up from our office and taken to [facility] appointment. VM left advising patient this. 11/05/20- Patient stopped by the office to  CD to have for upcoming [facility] appointment. She reported no one has reached out to her yet for appointment information. Advised her our office will reach out to office today. Patient denied any further questions or concerns. Spoke to Joanna at [facility]- she reported records are still being reviewed by MD and hopefully someone will be reaching out to patient by tomorrow to scheduled appointment. Updated patient- she verbalized understanding.

## 2020-11-03 NOTE — TELEPHONE ENCOUNTER
[address] at [place]  [phone]    11/3/20 - Called Joanna at [facility] at [phone] and verified the patient's ID x 2 with [facility] and requested to speak with Kevin Lagos. The call was transferred and verified the patient's ID x 2 with Joanna. Asked about the status of the patient's referral and Kevin Lagos stated they have not received any faxes and requested that the patient's information be sent to fax number [phone] attention to Kevin Lagos. Informed Joanna that the patient's most recent note, labs and pathology will be faxed to the provided fax number. Kevin Lagos verbalized understanding and denied any questions or concerns.

## 2020-11-06 ENCOUNTER — TELEPHONE (OUTPATIENT)
Dept: ONCOLOGY | Age: 40
End: 2020-11-06

## 2020-11-10 ENCOUNTER — TELEPHONE (OUTPATIENT)
Dept: ONCOLOGY | Age: 40
End: 2020-11-10

## 2020-11-10 NOTE — TELEPHONE ENCOUNTER
Patient returned call, stated she was seen by a physician at [facility] who was interested in testing the proliferation rate of the mast cells in bone marrow. They were hoping to have the slides sent over to them for additional testing. Advised physician there just needs to request slides to be sent to them from our pathology department. This is common procedure and office at [facility] will know how to obtain these slides. Encouraged her to call with any further concerns.

## 2020-12-07 ENCOUNTER — TRANSCRIBE ORDER (OUTPATIENT)
Dept: SCHEDULING | Age: 40
End: 2020-12-07

## 2020-12-07 DIAGNOSIS — D47.02 SYSTEMIC MASTOCYTOSIS: Primary | ICD-10-CM

## 2020-12-15 ENCOUNTER — HOSPITAL ENCOUNTER (OUTPATIENT)
Dept: MAMMOGRAPHY | Age: 40
Discharge: HOME | End: 2020-12-15
Payer: COMMERCIAL

## 2020-12-15 DIAGNOSIS — D47.02 SYSTEMIC MASTOCYTOSIS: ICD-10-CM

## 2020-12-15 PROCEDURE — 77080 DXA BONE DENSITY AXIAL: CPT

## 2021-02-01 LAB — SARS-COV-2, NAA: NEGATIVE

## 2021-04-07 NOTE — PROGRESS NOTES
Outpatient Physical Therapy  DISCHARGE SUMMARY NOTE      [facility] [place]  [address], [address]  [place] [address]  Phone:  [phone]  Fax:  [phone]    Date of Visit: 04/07/2021    Patient: Rene Tomas  YOB: 1978  MRN: [id number]     Referring Provider: [place] [address]   Referring Diagnosis Chronic left shoulder pain [M25.512, G89.29]               Your patient is being discharged from Physical Therapy with the following comments:   · Patient has failed to schedule or reschedule follow-up visits    Comments:  Rene has not returned to PT since last completed session 2/28/21. At time of last session pt was to trial HEP maintenance program and return to MD if pain was not improved.     Limitations Remaining:  Unknown as pt has not returned to PT.     Recommendations:  Pt to follow-up with MD as needed. Thank you.     Jeny Lr, PT, DPT    Date: 4/7/2021        Assessment/Plan:     Diagnoses and all orders for this visit:    1. Colitis  -     REFERRAL TO GASTROENTEROLOGY  - Unchanged, ref to GI for likely colonoscopy, reasons to seek Urgent care discussed. 2. Skin lesion  -     REFERRAL TO DERMATOLOGY  - Unchanged, ref to derm for further evaluation, will work on getting in sooner than later due to family history of melanoma. Picture take of area in media            Discussed expected course/resolution/complications of diagnosis in detail with patient. Medication risks/benefits/costs/interactions/alternatives discussed with patient. Pt was given after visit summary which includes diagnoses, current medications & vitals. Pt expressed understanding with the diagnosis and plan        Subjective:      Ministerio Villaseñor is a 47 y.o. female who presents for had concerns including Abdominal Pain (abdominal pain intermittent x2 wks. ). Went to ER down in [facility] for lower abd pain was told she had cysitis and colitis and given Cipro and Flagyl and she has finished the antibiotics. Was seen on 10/6. Finished antibiotics on 10/12. Two nights ago started having abdominal pain again and diarrhea. Has abd pain right before going to the bathroom, described as cramping, bathroom relieves the pain. Denies fever, n/v, bloody stools. Having 5-10 runny stools a day for the past 2 days. Eating probiotics. Skin lesion  Left lower leg lesion, dark in color not sure when it began. Denies bleeding or changing. Current Outpatient Medications   Medication Sig Dispense Refill    multivitamin (ONE A DAY) tablet Take 1 Tab by mouth daily.       KAITLIB FE 0.8mg-25mcg(24) and 75 mg (4) chew CHEW 1 TABLET BY MOUTH EVERYDAY WITH OUT PILL FREE INTERVAL  4    ciprofloxacin HCl (CIPRO) 250 mg tablet TAKE 2 TABLETS BY MOUTH EVERY 12 HOURS FOR 10 DAYS      metroNIDAZOLE (FLAGYL) 500 mg tablet          Allergies   Allergen Reactions    Amoxicillin Nausea and Vomiting    Antihistamine [Triprolidine-Pseudoephedrine] Seizures     Past Medical History:   Diagnosis Date    Depression     Family history of breast cancer     Negative genetic testing for BRRC gene    Hepatitis 2006    unknown type-got from exotic animal    Incontinentia pigmenti     associated with seizures.  Seizure ([address]     childhood, but stays away from antihistamines which are a trigger. Last seizure age 7.   Phenytoin in past.    TB lung, latent     2019, treated with 9 months of INH     Past Surgical History:   Procedure Laterality Date    HX SKIN BIOPSY       Family History   Problem Relation Age of Onset   [facility] Mother         62, with reoccurence year later   [facility] Diabetes Mother     Thyroid Disease Mother     Melanoma Father     Breast Cancer Paternal Grandfather 66     Social History     Socioeconomic History    Marital status:      Spouse name: Not on file    Number of children: Not on file    Years of education: Not on file    Highest education level: Not on file   Occupational History    Not on file   Social Needs    Financial resource strain: Not on file    Food insecurity     Worry: Not on file     Inability: Not on file    Transportation needs     Medical: Not on file     Non-medical: Not on file   Tobacco Use    Smoking status: Never Smoker    Smokeless tobacco: Never Used   Substance and Sexual Activity    Alcohol use: Yes     Frequency: Never     Comment: social    Drug use: No    Sexual activity: Yes     Partners: Male   Lifestyle    Physical activity     Days per week: Not on file     Minutes per session: Not on file    Stress: Not on file   Relationships    Social connections     Talks on phone: Not on file     Gets together: Not on file     Attends Congregational service: Not on file     Active member of club or organization: Not on file     Attends meetings of clubs or organizations: Not on file     Relationship status: Not on file    Intimate partner violence     Fear of current or ex partner: Not on file     Emotionally abused: Not on file     Physically abused: Not on file     Forced sexual activity: Not on file   Other Topics Concern    Not on file   Social History Narrative    Not on file       HPI      ROS:   Review of Systems   Constitutional: Negative for chills, fever and malaise/fatigue. Eyes: Negative for blurred vision. Respiratory: Negative for cough and shortness of breath. Cardiovascular: Negative for chest pain, palpitations and leg swelling. Gastrointestinal: Positive for abdominal pain and diarrhea. Negative for blood in stool, constipation, heartburn, melena, nausea and vomiting. Neurological: Negative for dizziness and headaches. Objective:     Visit Vitals  /84 (BP 1 Location: Left arm, BP Patient Position: Sitting)   Pulse 99   Temp 98.3 °F (36.8 °C) (Oral)   Resp 16   Ht 5' (1.524 m)   Wt 136 lb (61.7 kg)   SpO2 99%   BMI 26.56 kg/m²         Vitals and Nurse Documentation reviewed. Physical Exam  Constitutional:       General: She is not in acute distress. Appearance: She is not diaphoretic. HENT:      Head: Normocephalic and atraumatic. Cardiovascular:      Rate and Rhythm: Normal rate and regular rhythm. Heart sounds: Normal heart sounds. No murmur. No friction rub. No gallop. Pulmonary:      Effort: Pulmonary effort is normal. No respiratory distress. Breath sounds: Normal breath sounds. No wheezing or rales. Abdominal:      General: Bowel sounds are normal. There is no distension or abdominal bruit. Tenderness: There is abdominal tenderness in the suprapubic area. Skin:     General: Skin is warm and dry. Coloration: Skin is not pale. Comments: Small round slightly raised dark lesion, unlike others on body present left lower posterior leg   Neurological:      Mental Status: She is alert and oriented to person, place, and time.    Psychiatric:         Mood and Affect: Affect normal. Mood is not anxious or depressed. Behavior: Behavior is not agitated. Thought Content: Thought content does not include homicidal or suicidal ideation. Results for orders placed or performed during the hospital encounter of 10/14/20   CBC WITH AUTOMATED DIFF   Result Value Ref Range    WBC 6.1 3.6 - 11.0 K/uL    RBC 4.21 3.80 - 5.20 M/uL    HGB 13.7 11.5 - 16.0 g/dL    HCT 39.8 35.0 - 47.0 %    MCV 94.5 80.0 - 99.0 FL    MCH 32.5 26.0 - 34.0 PG    MCHC 34.4 30.0 - 36.5 g/dL    RDW 11.8 11.5 - 14.5 %    PLATELET 770 136 - 861 K/uL    MPV 8.4 (L) 8.9 - 12.9 FL    NRBC 0.0 0  WBC    ABSOLUTE NRBC 0.00 0.00 - 0.01 K/uL    NEUTROPHILS 66 32 - 75 %    LYMPHOCYTES 29 12 - 49 %    MONOCYTES 3 (L) 5 - 13 %    EOSINOPHILS 2 0 - 7 %    BASOPHILS 0 0 - 1 %    IMMATURE GRANULOCYTES 0 0.0 - 0.5 %    ABS. NEUTROPHILS 4.0 1.8 - 8.0 K/UL    ABS. LYMPHOCYTES 1.8 0.8 - 3.5 K/UL    ABS. MONOCYTES 0.2 0.0 - 1.0 K/UL    ABS. EOSINOPHILS 0.1 0.0 - 0.4 K/UL    ABS. BASOPHILS 0.0 0.0 - 0.1 K/UL    ABS. IMM.  GRANS. 0.0 0.00 - 0.04 K/UL    DF AUTOMATED

## 2021-06-16 ENCOUNTER — OFFICE VISIT (OUTPATIENT)
Dept: FAMILY MEDICINE CLINIC | Age: 41
End: 2021-06-16
Payer: COMMERCIAL

## 2021-06-16 VITALS
DIASTOLIC BLOOD PRESSURE: 84 MMHG | SYSTOLIC BLOOD PRESSURE: 149 MMHG | HEART RATE: 99 BPM | BODY MASS INDEX: 26.06 KG/M2 | HEIGHT: 61 IN | WEIGHT: 138 LBS | TEMPERATURE: 98.6 F | OXYGEN SATURATION: 100 % | RESPIRATION RATE: 16 BRPM

## 2021-06-16 DIAGNOSIS — E55.9 VITAMIN D DEFICIENCY: ICD-10-CM

## 2021-06-16 DIAGNOSIS — R00.2 PALPITATIONS: Primary | ICD-10-CM

## 2021-06-16 DIAGNOSIS — F41.9 ANXIETY: ICD-10-CM

## 2021-06-16 DIAGNOSIS — R45.89 SENSE OF IMPENDING DOOM: ICD-10-CM

## 2021-06-16 PROCEDURE — 99214 OFFICE O/P EST MOD 30 MIN: CPT | Performed by: NURSE PRACTITIONER

## 2021-06-16 PROCEDURE — 93000 ELECTROCARDIOGRAM COMPLETE: CPT | Performed by: NURSE PRACTITIONER

## 2021-06-16 RX ORDER — BUSPIRONE HYDROCHLORIDE 5 MG/1
5 TABLET ORAL 2 TIMES DAILY
Qty: 60 TABLET | Refills: 1 | Status: SHIPPED | OUTPATIENT
Start: 2021-06-16 | End: 2021-07-08 | Stop reason: SDUPTHER

## 2021-06-16 RX ORDER — EPINEPHRINE 0.3 MG/.3ML
0.3 INJECTION SUBCUTANEOUS
COMMUNITY
Start: 2020-12-04

## 2021-06-16 RX ORDER — HYDROXYZINE 25 MG/1
25 TABLET, FILM COATED ORAL
Qty: 30 TABLET | Refills: 0 | Status: SHIPPED | OUTPATIENT
Start: 2021-06-16 | End: 2021-06-26

## 2021-06-16 NOTE — PROGRESS NOTES
Identified pt with two pt identifiers(name and ). Reviewed record in preparation for visit and have obtained necessary documentation. Chief Complaint   Patient presents with    Rapid Heart Rate     For x2-3 weeks; chest discomfort         Health Maintenance Due   Topic    Hepatitis C Screening     COVID-19 Vaccine (1)    PAP AKA CERVICAL CYTOLOGY        Coordination of Care Questionnaire:  :   1) Have you been to an emergency room, urgent care, or hospitalized since your last visit? If yes, where when, and reason for visit? no      2. Have seen or consulted any other health care provider since your last visit? If yes, where when, and reason for visit? Yes, [facility]        Patient is accompanied by self I have received verbal consent from [facility] to discuss any/all medical information while they are present in the room.

## 2021-06-16 NOTE — PROGRESS NOTES
Assessment/Plan:     Diagnoses and all orders for this visit:    1. Palpitations  -     AMB POC EKG ROUTINE W/ 12 LEADS, INTER & REP  -     XR CHEST PA LAT; Future  -     METABOLIC PANEL, COMPREHENSIVE; Future  -     CBC WITH AUTOMATED DIFF; Future  -     HEMOGLOBIN A1C WITH EAG; Future  -     THYROID CASCADE PROFILE; Future  -     URINALYSIS W/ RFLX MICROSCOPIC; Future  - EKG normal.  Chest x-ray today. Labs today. Seek urgent care for chest pain shortness of breath follow-up in 4 weeks    2. Anxiety  -     VITAMIN B12 & FOLATE; Future  -     busPIRone (BUSPAR) 5 mg tablet; Take 1 Tablet by mouth two (2) times a day. -     hydrOXYzine HCL (ATARAX) 25 mg tablet; Take 1 Tablet by mouth three (3) times daily as needed for Anxiety for up to 10 days. - New problem, labs today, start BuSpar as directed, side effects discussed. Hydroxyzine as needed, side effects discussed. Follow-up in 4 weeks    3. Sense of impending doom  -     D DIMER; Future  - Stable, labs today, if has feelings of impending doom again advised to seek urgent care    4. Vitamin D deficiency  -     VITAMIN D, 25 HYDROXY; Future  - Presumed stable labs today            Discussed expected course/resolution/complications of diagnosis in detail with patient. Medication risks/benefits/costs/interactions/alternatives discussed with patient. Pt was given after visit summary which includes diagnoses, current medications & vitals. Pt expressed understanding with the diagnosis and plan        Subjective:      Nina Bermeo is a 46 y.o. female who presents for had concerns including Rapid Heart Rate (For x2-3 weeks; chest discomfort ). Pt reports elevated heart rate with an ER visit in late February/March indicating low potassium levels. Pt states ever since then she has felt her heart beating \"strongly\" and a feeling of \"impending doom\" before she goes to bed at night.       Current Outpatient Medications   Medication Sig Dispense Refill    EPINEPHrine (EPIPEN) 0.3 mg/0.3 mL injection 0.3 mg by IntraMUSCular route once as needed.  busPIRone (BUSPAR) 5 mg tablet Take 1 Tablet by mouth two (2) times a day. 60 Tablet 1    hydrOXYzine HCL (ATARAX) 25 mg tablet Take 1 Tablet by mouth three (3) times daily as needed for Anxiety for up to 10 days. 30 Tablet 0    multivitamin (ONE A DAY) tablet Take 1 Tab by mouth daily. 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  Indolent systemic mastocytosis     Seizure ([address]     childhood, but stays away from antihistamines which are a trigger. Last seizure age 7.   Phenytoin in past.    TB lung, latent     2019, treated with 9 months of INH     Past Surgical History:   Procedure Laterality Date    HX SKIN BIOPSY       Family History   Problem Relation Age of Onset   [facility] Breast Cancer Mother         62, with reoccurence year later   Scheryl Gem[facility] Diabetes Mother     Thyroid Disease Mother     Melanoma Father     Breast Cancer Paternal Grandfather 66     Social History     Socioeconomic History    Marital status:      Spouse name: Not on file    Number of children: Not on file    Years of education: Not on file    Highest education level: Not on file   Occupational History    Not on file   Tobacco Use    Smoking status: Never Smoker    Smokeless tobacco: Never Used   Vaping Use    Vaping Use: Never used   Substance and Sexual Activity    Alcohol use: Yes     Comment: social    Drug use: No    Sexual activity: Yes     Partners: Male   Other Topics Concern    Not on file   Social History Narrative    Not on file     Social Determinants of Health     Financial Resource Strain:     Difficulty of Paying Living Expenses:    Food Insecurity:     Worried About Running Out of Food in the Last Year:     [address] in the Last Year:    Transportation Needs:     Lack of Transportation (Medical):  Lack of Transportation (Non-Medical):    Physical Activity:     Days of Exercise per Week:     Minutes of Exercise per Session:    Stress:     Feeling of Stress :    Social Connections:     Frequency of Communication with Friends and Family:     Frequency of Social Gatherings with Friends and Family:     Attends Latter day Services:     Active Member of Clubs or Organizations:     Attends Club or Organization Meetings:     Marital Status:    Intimate Partner Violence:     Fear of Current or Ex-Partner:     Emotionally Abused:     Physically Abused:     Sexually Abused:        HPI      ROS:   Review of Systems   Constitutional: Negative for chills, fever and malaise/fatigue. Eyes: Negative for blurred vision. Respiratory: Negative for cough and shortness of breath. Cardiovascular: Positive for palpitations. Negative for chest pain and leg swelling. Neurological: Negative for dizziness and headaches. Psychiatric/Behavioral: Negative for depression, hallucinations, memory loss, substance abuse and suicidal ideas. The patient is nervous/anxious and has insomnia. Objective:     Visit Vitals  BP (!) 149/84 (BP 1 Location: Left upper arm, BP Patient Position: Sitting, BP Cuff Size: Adult)   Pulse 99   Temp 98.6 °F (37 °C) (Temporal)   Resp 16   Ht 5' 1\" (1.549 m)   Wt 138 lb (62.6 kg)   SpO2 100%   BMI 26.07 kg/m²         Vitals and Nurse Documentation reviewed. Physical Exam  Constitutional:       General: She is not in acute distress. Appearance: She is not diaphoretic. HENT:      Head: Normocephalic and atraumatic.    Cardiovascular: Rate and Rhythm: Normal rate and regular rhythm. Heart sounds: Normal heart sounds. No murmur heard. No friction rub. No gallop. Pulmonary:      Effort: Pulmonary effort is normal. No respiratory distress. Breath sounds: Normal breath sounds. No wheezing or rales. Skin:     General: Skin is warm and dry. Coloration: Skin is not pale. Neurological:      Mental Status: She is alert and oriented to person, place, and time. Psychiatric:         Mood and Affect: Affect normal. Mood is not anxious or depressed. Behavior: Behavior is not agitated. Thought Content: Thought content does not include homicidal or suicidal ideation. Results for orders placed or performed in visit on 06/16/21   VITAMIN B12 & FOLATE   Result Value Ref Range    Vitamin B12 354 193 - 986 pg/mL    Folate 31.9 (H) 5.0 - 21.0 ng/mL   VITAMIN D, 25 HYDROXY   Result Value Ref Range    Vitamin D 25-Hydroxy 43.9 30 - 100 ng/mL   THYROID CASCADE PROFILE   Result Value Ref Range    TSH 3.440 0.450 - 9.860 uIU/mL   METABOLIC PANEL, COMPREHENSIVE   Result Value Ref Range    Sodium 138 136 - 145 mmol/L    Potassium 3.8 3.5 - 5.1 mmol/L    Chloride 106 97 - 108 mmol/L    CO2 26 21 - 32 mmol/L    Anion gap 6 5 - 15 mmol/L    Glucose 107 (H) 65 - 100 mg/dL    BUN 9 6 - 20 MG/DL    Creatinine 0.53 (L) 0.55 - 1.02 MG/DL    BUN/Creatinine ratio 17 12 - 20      GFR est AA >60 >60 ml/min/1.73m2    GFR est non-AA >60 >60 ml/min/1.73m2    Calcium 9.4 8.5 - 10.1 MG/DL    Bilirubin, total 0.6 0.2 - 1.0 MG/DL    ALT (SGPT) 17 12 - 78 U/L    AST (SGOT) 5 (L) 15 - 37 U/L    Alk.  phosphatase 74 45 - 117 U/L    Protein, total 7.0 6.4 - 8.2 g/dL    Albumin 4.4 3.5 - 5.0 g/dL    Globulin 2.6 2.0 - 4.0 g/dL    A-G Ratio 1.7 1.1 - 2.2     HEMOGLOBIN A1C WITH EAG   Result Value Ref Range    Hemoglobin A1c 4.8 4.0 - 5.6 %    Est. average glucose 91 mg/dL   CBC WITH AUTOMATED DIFF   Result Value Ref Range    WBC 5.6 3.6 - 11.0 K/uL RBC 3.99 3.80 - 5.20 M/uL    HGB 12.9 11.5 - 16.0 g/dL    HCT 38.5 35.0 - 47.0 %    MCV 96.5 80.0 - 99.0 FL    MCH 32.3 26.0 - 34.0 PG    MCHC 33.5 30.0 - 36.5 g/dL    RDW 11.8 11.5 - 14.5 %    PLATELET 270 639 - 695 K/uL    MPV 9.4 8.9 - 12.9 FL    NRBC 0.0 0  WBC    ABSOLUTE NRBC 0.00 0.00 - 0.01 K/uL    NEUTROPHILS 54 32 - 75 %    LYMPHOCYTES 42 12 - 49 %    MONOCYTES 3 (L) 5 - 13 %    EOSINOPHILS 1 0 - 7 %    BASOPHILS 0 0 - 1 %    IMMATURE GRANULOCYTES 0 0.0 - 0.5 %    ABS. NEUTROPHILS 3.0 1.8 - 8.0 K/UL    ABS. LYMPHOCYTES 2.4 0.8 - 3.5 K/UL    ABS. MONOCYTES 0.2 0.0 - 1.0 K/UL    ABS. EOSINOPHILS 0.1 0.0 - 0.4 K/UL    ABS. BASOPHILS 0.0 0.0 - 0.1 K/UL    ABS. IMM.  GRANS. 0.0 0.00 - 0.04 K/UL    DF AUTOMATED     D DIMER   Result Value Ref Range    D-dimer <0.19 0.00 - 0.65 mg/L FEU   URINALYSIS W/ RFLX MICROSCOPIC   Result Value Ref Range    Color YELLOW/STRAW      Appearance TURBID (A) CLEAR      Specific gravity 1.014 1.003 - 1.030      pH (UA) 8.0 5.0 - 8.0      Protein Negative Negative mg/dL    Glucose Negative Negative mg/dL    Ketone Negative Negative mg/dL    Bilirubin Negative Negative      Blood Negative Negative      Urobilinogen 0.2 0.2 - 1.0 EU/dL    Nitrites Negative Negative      Leukocyte Esterase Negative Negative      WBC 0-4 0 - 4 /hpf    RBC 0-5 0 - 5 /hpf    Epithelial cells MODERATE (A) FEW /lpf    Bacteria Negative Negative /hpf    Hyaline cast 0-2 0 - 5 /lpf

## 2021-06-17 ENCOUNTER — HOSPITAL ENCOUNTER (OUTPATIENT)
Dept: GENERAL RADIOLOGY | Age: 41
Discharge: HOME | End: 2021-06-17
Attending: NURSE PRACTITIONER
Payer: COMMERCIAL

## 2021-06-17 DIAGNOSIS — R00.2 PALPITATIONS: ICD-10-CM

## 2021-06-17 LAB
25(OH)D3 SERPL-MCNC: 43.9 NG/ML (ref 30–100)
ALBUMIN SERPL-MCNC: 4.4 G/DL (ref 3.5–5)
ALBUMIN/GLOB SERPL: 1.7 {RATIO} (ref 1.1–2.2)
ALP SERPL-CCNC: 74 U/L (ref 45–117)
ALT SERPL-CCNC: 17 U/L (ref 12–78)
ANION GAP SERPL CALC-SCNC: 6 MMOL/L (ref 5–15)
APPEARANCE UR: ABNORMAL
AST SERPL-CCNC: 5 U/L (ref 15–37)
BACTERIA URNS QL MICRO: NEGATIVE /HPF
BASOPHILS # BLD: 0 K/UL (ref 0–0.1)
BASOPHILS NFR BLD: 0 % (ref 0–1)
BILIRUB SERPL-MCNC: 0.6 MG/DL (ref 0.2–1)
BILIRUB UR QL: NEGATIVE
BUN SERPL-MCNC: 9 MG/DL (ref 6–20)
BUN/CREAT SERPL: 17 (ref 12–20)
CALCIUM SERPL-MCNC: 9.4 MG/DL (ref 8.5–10.1)
CHLORIDE SERPL-SCNC: 106 MMOL/L (ref 97–108)
CO2 SERPL-SCNC: 26 MMOL/L (ref 21–32)
COLOR UR: ABNORMAL
CREAT SERPL-MCNC: 0.53 MG/DL (ref 0.55–1.02)
D DIMER PPP FEU-MCNC: <0.19 MG/L FEU (ref 0–0.65)
DIFFERENTIAL METHOD BLD: ABNORMAL
EOSINOPHIL # BLD: 0.1 K/UL (ref 0–0.4)
EOSINOPHIL NFR BLD: 1 % (ref 0–7)
EPITH CASTS URNS QL MICRO: ABNORMAL /LPF
ERYTHROCYTE [DISTWIDTH] IN BLOOD BY AUTOMATED COUNT: 11.8 % (ref 11.5–14.5)
EST. AVERAGE GLUCOSE BLD GHB EST-MCNC: 91 MG/DL
FOLATE SERPL-MCNC: 31.9 NG/ML (ref 5–21)
GLOBULIN SER CALC-MCNC: 2.6 G/DL (ref 2–4)
GLUCOSE SERPL-MCNC: 107 MG/DL (ref 65–100)
GLUCOSE UR STRIP.AUTO-MCNC: NEGATIVE MG/DL
HBA1C MFR BLD: 4.8 % (ref 4–5.6)
HCT VFR BLD AUTO: 38.5 % (ref 35–47)
HGB BLD-MCNC: 12.9 G/DL (ref 11.5–16)
HGB UR QL STRIP: NEGATIVE
HYALINE CASTS URNS QL MICRO: ABNORMAL /LPF (ref 0–5)
IMM GRANULOCYTES # BLD AUTO: 0 K/UL (ref 0–0.04)
IMM GRANULOCYTES NFR BLD AUTO: 0 % (ref 0–0.5)
KETONES UR QL STRIP.AUTO: NEGATIVE MG/DL
LEUKOCYTE ESTERASE UR QL STRIP.AUTO: NEGATIVE
LYMPHOCYTES # BLD: 2.4 K/UL (ref 0.8–3.5)
LYMPHOCYTES NFR BLD: 42 % (ref 12–49)
MCH RBC QN AUTO: 32.3 PG (ref 26–34)
MCHC RBC AUTO-ENTMCNC: 33.5 G/DL (ref 30–36.5)
MCV RBC AUTO: 96.5 FL (ref 80–99)
MONOCYTES # BLD: 0.2 K/UL (ref 0–1)
MONOCYTES NFR BLD: 3 % (ref 5–13)
NEUTS SEG # BLD: 3 K/UL (ref 1.8–8)
NEUTS SEG NFR BLD: 54 % (ref 32–75)
NITRITE UR QL STRIP.AUTO: NEGATIVE
NRBC # BLD: 0 K/UL (ref 0–0.01)
NRBC BLD-RTO: 0 PER 100 WBC
PH UR STRIP: 8 [PH] (ref 5–8)
PLATELET # BLD AUTO: 334 K/UL (ref 150–400)
PMV BLD AUTO: 9.4 FL (ref 8.9–12.9)
POTASSIUM SERPL-SCNC: 3.8 MMOL/L (ref 3.5–5.1)
PROT SERPL-MCNC: 7 G/DL (ref 6.4–8.2)
PROT UR STRIP-MCNC: NEGATIVE MG/DL
RBC # BLD AUTO: 3.99 M/UL (ref 3.8–5.2)
RBC #/AREA URNS HPF: ABNORMAL /HPF (ref 0–5)
SODIUM SERPL-SCNC: 138 MMOL/L (ref 136–145)
SP GR UR REFRACTOMETRY: 1.01 (ref 1–1.03)
UROBILINOGEN UR QL STRIP.AUTO: 0.2 EU/DL (ref 0.2–1)
VIT B12 SERPL-MCNC: 354 PG/ML (ref 193–986)
WBC # BLD AUTO: 5.6 K/UL (ref 3.6–11)
WBC URNS QL MICRO: ABNORMAL /HPF (ref 0–4)

## 2021-06-17 PROCEDURE — 71046 X-RAY EXAM CHEST 2 VIEWS: CPT

## 2021-06-18 LAB — TSH SERPL-ACNC: 3.44 UIU/ML (ref 0.45–4.5)

## 2021-06-30 ENCOUNTER — TELEPHONE (OUTPATIENT)
Dept: FAMILY MEDICINE CLINIC | Age: 41
End: 2021-06-30

## 2021-07-08 DIAGNOSIS — F41.9 ANXIETY: ICD-10-CM

## 2021-07-09 RX ORDER — BUSPIRONE HYDROCHLORIDE 5 MG/1
5 TABLET ORAL 2 TIMES DAILY
Qty: 120 TABLET | Refills: 1 | Status: SHIPPED | OUTPATIENT
Start: 2021-07-09 | End: 2021-07-13 | Stop reason: SDUPTHER

## 2021-07-13 ENCOUNTER — OFFICE VISIT (OUTPATIENT)
Dept: FAMILY MEDICINE CLINIC | Age: 41
End: 2021-07-13
Payer: COMMERCIAL

## 2021-07-13 VITALS
BODY MASS INDEX: 26.47 KG/M2 | WEIGHT: 140.2 LBS | RESPIRATION RATE: 16 BRPM | SYSTOLIC BLOOD PRESSURE: 135 MMHG | OXYGEN SATURATION: 98 % | HEIGHT: 61 IN | DIASTOLIC BLOOD PRESSURE: 90 MMHG | HEART RATE: 91 BPM | TEMPERATURE: 98.2 F

## 2021-07-13 DIAGNOSIS — R00.2 PALPITATIONS: ICD-10-CM

## 2021-07-13 DIAGNOSIS — F41.9 ANXIETY: Primary | ICD-10-CM

## 2021-07-13 PROCEDURE — 99213 OFFICE O/P EST LOW 20 MIN: CPT | Performed by: NURSE PRACTITIONER

## 2021-07-13 RX ORDER — HYDROXYZINE 25 MG/1
25 TABLET, FILM COATED ORAL
Qty: 60 TABLET | Refills: 1 | Status: SHIPPED | OUTPATIENT
Start: 2021-07-13 | End: 2021-08-27

## 2021-07-13 RX ORDER — BUSPIRONE HYDROCHLORIDE 7.5 MG/1
7.5 TABLET ORAL
Qty: 270 TABLET | Refills: 0 | Status: SHIPPED | OUTPATIENT
Start: 2021-07-13 | End: 2021-10-05

## 2021-07-13 NOTE — PROGRESS NOTES
Identified pt with two pt identifiers(name and ). Reviewed record in preparation for visit and have obtained necessary documentation. Chief Complaint   Patient presents with    Palpitations     x1 mos f/u         Health Maintenance Due   Topic    Hepatitis C Screening     PAP AKA CERVICAL CYTOLOGY        Coordination of Care Questionnaire:  :   1) Have you been to an emergency room, urgent care, or hospitalized since your last visit? If yes, where when, and reason for visit? Yes, [facility] for palpitation       2. Have seen or consulted any other health care provider since your last visit? If yes, where when, and reason for visit?  no        Patient is accompanied by self I have received verbal consent from [facility] [employer] to discuss any/all medical information while they are present in the room.

## 2021-07-13 NOTE — PROGRESS NOTES
Assessment/Plan:     Diagnoses and all orders for this visit:    1. Anxiety  -     busPIRone (BUSPAR) 7.5 mg tablet; Take 1 Tablet by mouth three (3) times daily (with meals). -     hydrOXYzine HCL (ATARAX) 25 mg tablet; Take 1 Tablet by mouth three (3) times daily as needed for Anxiety for up to 60 days.   - No change, will increase today to 7.5mg TID. Side effects discussed. Follow up in 4 weeks. 3 month supply given due to pharmacy requesting. 2. Palpitations   -improved        Follow-up and Dispositions    · Return in about 4 weeks (around 8/10/2021) for Follow Up. Discussed expected course/resolution/complications of diagnosis in detail with patient. Medication risks/benefits/costs/interactions/alternatives discussed with patient. Pt was given after visit summary which includes diagnoses, current medications & vitals. Pt expressed understanding with the diagnosis and plan        Subjective:      Dano Najera is a 46 y.o. female who presents for had concerns including Palpitations (x1 mos f/u ). Here today for follow up on starting Buspar    Went to ER for pulse of 145-150 in the middle of the night. Wore the holter monitor. Typically waking up in the middle of the night with heart racing and having a night mare. States they are moving to [place] and she has quit her job. Palpitations have improved. Current Outpatient Medications   Medication Sig Dispense Refill    busPIRone (BUSPAR) 7.5 mg tablet Take 1 Tablet by mouth three (3) times daily (with meals). 270 Tablet 0    hydrOXYzine HCL (ATARAX) 25 mg tablet Take 1 Tablet by mouth three (3) times daily as needed for Anxiety for up to 60 days. take 60 Tablet 1    EPINEPHrine (EPIPEN) 0.3 mg/0.3 mL injection 0.3 mg by IntraMUSCular route once as needed.  multivitamin (ONE A DAY) tablet Take 1 Tab by mouth daily.      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  Indolent systemic mastocytosis     Seizure ([facility] [address]     childhood, but stays away from antihistamines which are a trigger. Last seizure age 7. Phenytoin in past.    TB lung, latent     2019, treated with 9 months of INH     Past Surgical History:   Procedure Laterality Date    HX SKIN BIOPSY       Family History   Problem Relation Age of Onset   [employer] Breast Cancer Mother         62, with reoccurence year later   [employer] Diabetes Mother     Thyroid Disease Mother     Melanoma Father     Breast Cancer Paternal Grandfather 66     Social History     Socioeconomic History    Marital status:      Spouse name: Not on file    Number of children: Not on file    Years of education: Not on file    Highest education level: Not on file   Occupational History    Not on file   Tobacco Use    Smoking status: Never Smoker    Smokeless tobacco: Never Used   Vaping Use    Vaping Use: Never used   Substance and Sexual Activity    Alcohol use: Yes     Comment: social    Drug use: No    Sexual activity: Yes     Partners: Male   Other Topics Concern    Not on file   Social History Narrative    Not on file     Social Determinants of Health     Financial Resource Strain:     Difficulty of Paying Living Expenses:    Food Insecurity:     Worried About Running Out of Food in the Last Year:     Ran Out of Food in the Last Year:    Transportation Needs:     Lack of Transportation (Medical):      Lack of Transportation (Non-Medical):    Physical Activity:     Days of Exercise per Week:     Minutes of Exercise per Session:    Stress:  Feeling of Stress :    Social Connections:     Frequency of Communication with Friends and Family:     Frequency of Social Gatherings with Friends and Family:     Attends [employer] Services:     Active Member of Clubs or Organizations:     Attends Club or Organization Meetings:     Marital Status:    Intimate Partner Violence:     Fear of Current or Ex-Partner:     Emotionally Abused:     Physically Abused:     Sexually Abused:        HPI      ROS:   Review of Systems   Constitutional: Negative for chills, fever and malaise/fatigue. Eyes: Negative for blurred vision. Respiratory: Negative for cough and shortness of breath. Cardiovascular: Negative for chest pain, palpitations and leg swelling. Neurological: Negative for dizziness and headaches. Objective:     Visit Vitals  BP (!) 135/90 (BP 1 Location: Left upper arm, BP Patient Position: Sitting, BP Cuff Size: Adult)   Pulse 91   Temp 98.2 °F (36.8 °C) (Temporal)   Resp 16   Ht 5' 1\" (1.549 m)   Wt 140 lb 3.2 oz (63.6 kg)   SpO2 98%   BMI 26.49 kg/m²         Vitals and Nurse Documentation reviewed. Physical Exam  Constitutional:       General: She is not in acute distress. Appearance: She is not diaphoretic. HENT:      Head: Normocephalic and atraumatic. Cardiovascular:      Rate and Rhythm: Normal rate and regular rhythm. Heart sounds: Normal heart sounds. No murmur heard. No friction rub. No gallop. Pulmonary:      Effort: Pulmonary effort is normal. No respiratory distress. Breath sounds: Normal breath sounds. No wheezing or rales. Skin:     General: Skin is warm and dry. Coloration: Skin is not pale. Neurological:      Mental Status: She is alert and oriented to person, place, and time. Psychiatric:         Mood and Affect: Affect normal. Mood is not anxious or depressed. Behavior: Behavior is not agitated. Thought Content:  Thought content does not include homicidal or suicidal ideation. Results for orders placed or performed in visit on 06/16/21   VITAMIN B12 & FOLATE   Result Value Ref Range    Vitamin B12 354 193 - 986 pg/mL    Folate 31.9 (H) 5.0 - 21.0 ng/mL   VITAMIN D, 25 HYDROXY   Result Value Ref Range    Vitamin D 25-Hydroxy 43.9 30 - 100 ng/mL   THYROID CASCADE PROFILE   Result Value Ref Range    TSH 3.440 0.450 - 4.896 uIU/mL   METABOLIC PANEL, COMPREHENSIVE   Result Value Ref Range    Sodium 138 136 - 145 mmol/L    Potassium 3.8 3.5 - 5.1 mmol/L    Chloride 106 97 - 108 mmol/L    CO2 26 21 - 32 mmol/L    Anion gap 6 5 - 15 mmol/L    Glucose 107 (H) 65 - 100 mg/dL    BUN 9 6 - 20 MG/DL    Creatinine 0.53 (L) 0.55 - 1.02 MG/DL    BUN/Creatinine ratio 17 12 - 20      GFR est AA >60 >60 ml/min/1.73m2    GFR est non-AA >60 >60 ml/min/1.73m2    Calcium 9.4 8.5 - 10.1 MG/DL    Bilirubin, total 0.6 0.2 - 1.0 MG/DL    ALT (SGPT) 17 12 - 78 U/L    AST (SGOT) 5 (L) 15 - 37 U/L    Alk. phosphatase 74 45 - 117 U/L    Protein, total 7.0 6.4 - 8.2 g/dL    Albumin 4.4 3.5 - 5.0 g/dL    Globulin 2.6 2.0 - 4.0 g/dL    A-G Ratio 1.7 1.1 - 2.2     HEMOGLOBIN A1C WITH EAG   Result Value Ref Range    Hemoglobin A1c 4.8 4.0 - 5.6 %    Est. average glucose 91 mg/dL   CBC WITH AUTOMATED DIFF   Result Value Ref Range    WBC 5.6 3.6 - 11.0 K/uL    RBC 3.99 3.80 - 5.20 M/uL    HGB 12.9 11.5 - 16.0 g/dL    HCT 38.5 35.0 - 47.0 %    MCV 96.5 80.0 - 99.0 FL    MCH 32.3 26.0 - 34.0 PG    MCHC 33.5 30.0 - 36.5 g/dL    RDW 11.8 11.5 - 14.5 %    PLATELET 692 630 - 328 K/uL    MPV 9.4 8.9 - 12.9 FL    NRBC 0.0 0  WBC    ABSOLUTE NRBC 0.00 0.00 - 0.01 K/uL    NEUTROPHILS 54 32 - 75 %    LYMPHOCYTES 42 12 - 49 %    MONOCYTES 3 (L) 5 - 13 %    EOSINOPHILS 1 0 - 7 %    BASOPHILS 0 0 - 1 %    IMMATURE GRANULOCYTES 0 0.0 - 0.5 %    ABS. NEUTROPHILS 3.0 1.8 - 8.0 K/UL    ABS. LYMPHOCYTES 2.4 0.8 - 3.5 K/UL    ABS. MONOCYTES 0.2 0.0 - 1.0 K/UL    ABS. EOSINOPHILS 0.1 0.0 - 0.4 K/UL    ABS.  BASOPHILS 0.0 0.0 - 0.1 K/UL    ABS. IMM.  GRANS. 0.0 0.00 - 0.04 K/UL    DF AUTOMATED     D DIMER   Result Value Ref Range    D-dimer <0.19 0.00 - 0.65 mg/L FEU   URINALYSIS W/ RFLX MICROSCOPIC   Result Value Ref Range    Color YELLOW/STRAW      Appearance TURBID (A) CLEAR      Specific gravity 1.014 1.003 - 1.030      pH (UA) 8.0 5.0 - 8.0      Protein Negative Negative mg/dL    Glucose Negative Negative mg/dL    Ketone Negative Negative mg/dL    Bilirubin Negative Negative      Blood Negative Negative      Urobilinogen 0.2 0.2 - 1.0 EU/dL    Nitrites Negative Negative      Leukocyte Esterase Negative Negative      WBC 0-4 0 - 4 /hpf    RBC 0-5 0 - 5 /hpf    Epithelial cells MODERATE (A) FEW /lpf    Bacteria Negative Negative /hpf    Hyaline cast 0-2 0 - 5 /lpf

## 2021-08-26 DIAGNOSIS — F41.9 ANXIETY: ICD-10-CM

## 2021-08-27 RX ORDER — HYDROXYZINE 25 MG/1
TABLET, FILM COATED ORAL
Qty: 60 TABLET | Refills: 1 | Status: SHIPPED | OUTPATIENT
Start: 2021-08-27

## 2021-10-04 DIAGNOSIS — F41.9 ANXIETY: ICD-10-CM

## 2021-10-05 RX ORDER — BUSPIRONE HYDROCHLORIDE 7.5 MG/1
TABLET ORAL
Qty: 270 TABLET | Refills: 0 | Status: SHIPPED | OUTPATIENT
Start: 2021-10-05

## 2022-03-19 PROBLEM — F32.A DEPRESSION: Status: ACTIVE | Noted: 2018-10-04

## 2022-03-20 PROBLEM — K64.9 HEMORRHOIDS: Status: ACTIVE | Noted: 2018-10-04

## 2023-05-24 RX ORDER — NORETHINDRONE AND ETHINYL ESTRADIOL AND FERROUS FUMARATE 0.8-25(24)
KIT ORAL
COMMUNITY
Start: 2018-07-16

## 2023-05-24 RX ORDER — EPINEPHRINE 0.3 MG/.3ML
0.3 INJECTION SUBCUTANEOUS
COMMUNITY
Start: 2020-12-04

## 2023-05-24 RX ORDER — BUSPIRONE HYDROCHLORIDE 7.5 MG/1
TABLET ORAL
COMMUNITY
Start: 2021-10-05

## 2023-05-24 RX ORDER — HYDROXYZINE HYDROCHLORIDE 25 MG/1
TABLET, FILM COATED ORAL
COMMUNITY
Start: 2021-08-27